# Patient Record
Sex: MALE | Race: WHITE | NOT HISPANIC OR LATINO | Employment: FULL TIME | URBAN - METROPOLITAN AREA
[De-identification: names, ages, dates, MRNs, and addresses within clinical notes are randomized per-mention and may not be internally consistent; named-entity substitution may affect disease eponyms.]

---

## 2018-05-26 ENCOUNTER — HOSPITAL ENCOUNTER (EMERGENCY)
Facility: HOSPITAL | Age: 43
Discharge: HOME | End: 2018-05-26
Attending: EMERGENCY MEDICINE | Admitting: EMERGENCY MEDICINE

## 2018-05-26 VITALS
BODY MASS INDEX: 32.73 KG/M2 | TEMPERATURE: 97.4 F | SYSTOLIC BLOOD PRESSURE: 100 MMHG | HEIGHT: 74 IN | WEIGHT: 255 LBS | HEART RATE: 88 BPM | OXYGEN SATURATION: 95 % | RESPIRATION RATE: 18 BRPM | DIASTOLIC BLOOD PRESSURE: 66 MMHG

## 2018-05-26 DIAGNOSIS — Z86.03 HISTORY OF CRANIOPHARYNGIOMA: ICD-10-CM

## 2018-05-26 DIAGNOSIS — R51.9 ACUTE NONINTRACTABLE HEADACHE, UNSPECIFIED HEADACHE TYPE: Primary | ICD-10-CM

## 2018-05-26 PROCEDURE — 99282 EMERGENCY DEPT VISIT SF MDM: CPT

## 2020-10-14 ENCOUNTER — TELEPHONE (OUTPATIENT)
Dept: ENDOCRINOLOGY | Facility: CLINIC | Age: 45
End: 2020-10-14

## 2020-10-14 RX ORDER — TESTOSTERONE CYPIONATE 200 MG/ML
200 INJECTION, SOLUTION INTRAMUSCULAR
Status: CANCELLED | OUTPATIENT
Start: 2020-10-14

## 2020-10-15 ENCOUNTER — TELEPHONE (OUTPATIENT)
Dept: ENDOCRINOLOGY | Facility: CLINIC | Age: 45
End: 2020-10-15

## 2020-10-15 NOTE — TELEPHONE ENCOUNTER
PT said he was under the impression that his Rx testosterone was supposed to be put in and ready for pickup. He is currently out and ask that someone reach out to him with status update. PT confirmed pickup location at [facility] in [place]

## 2020-10-20 ENCOUNTER — LAB (OUTPATIENT)
Dept: LAB | Facility: HOSPITAL | Age: 45
End: 2020-10-20

## 2020-10-20 ENCOUNTER — OFFICE VISIT (OUTPATIENT)
Dept: ENDOCRINOLOGY | Facility: CLINIC | Age: 45
End: 2020-10-20

## 2020-10-20 VITALS
BODY MASS INDEX: 35.89 KG/M2 | OXYGEN SATURATION: 97 % | DIASTOLIC BLOOD PRESSURE: 84 MMHG | TEMPERATURE: 97.5 F | WEIGHT: 270.8 LBS | HEART RATE: 84 BPM | HEIGHT: 73 IN | SYSTOLIC BLOOD PRESSURE: 136 MMHG

## 2020-10-20 DIAGNOSIS — E29.1 MALE HYPOGONADISM: Primary | ICD-10-CM

## 2020-10-20 DIAGNOSIS — E03.8 SECONDARY HYPOTHYROIDISM: ICD-10-CM

## 2020-10-20 DIAGNOSIS — E23.0 HYPOGONADOTROPIC HYPOGONADISM (HCC): ICD-10-CM

## 2020-10-20 PROCEDURE — 84402 ASSAY OF FREE TESTOSTERONE: CPT | Performed by: INTERNAL MEDICINE

## 2020-10-20 PROCEDURE — 80053 COMPREHEN METABOLIC PANEL: CPT | Performed by: INTERNAL MEDICINE

## 2020-10-20 PROCEDURE — 84439 ASSAY OF FREE THYROXINE: CPT | Performed by: INTERNAL MEDICINE

## 2020-10-20 PROCEDURE — 85027 COMPLETE CBC AUTOMATED: CPT | Performed by: INTERNAL MEDICINE

## 2020-10-20 PROCEDURE — 84403 ASSAY OF TOTAL TESTOSTERONE: CPT | Performed by: INTERNAL MEDICINE

## 2020-10-20 PROCEDURE — 99213 OFFICE O/P EST LOW 20 MIN: CPT | Performed by: INTERNAL MEDICINE

## 2020-10-20 RX ORDER — FLUOXETINE HYDROCHLORIDE 20 MG/1
20 CAPSULE ORAL 3 TIMES DAILY
COMMUNITY
Start: 2020-10-09

## 2020-10-20 RX ORDER — GLIPIZIDE 2.5 MG/1
10 TABLET, EXTENDED RELEASE ORAL ONCE
COMMUNITY
Start: 2020-10-09 | End: 2021-04-28

## 2020-10-20 RX ORDER — TESTOSTERONE CYPIONATE 200 MG/ML
0.37 VIAL (ML) INTRAMUSCULAR WEEKLY
COMMUNITY
End: 2020-10-20 | Stop reason: SDUPTHER

## 2020-10-20 RX ORDER — TESTOSTERONE CYPIONATE 200 MG/ML
0.37 VIAL (ML) INTRAMUSCULAR WEEKLY
Qty: 1.96 ML | Refills: 5 | Status: SHIPPED | OUTPATIENT
Start: 2020-10-20 | End: 2021-01-25

## 2020-10-20 RX ORDER — LEVOTHYROXINE SODIUM 175 UG/1
TABLET ORAL
COMMUNITY
End: 2021-04-06

## 2020-10-20 RX ORDER — OMEPRAZOLE 40 MG/1
40 CAPSULE, DELAYED RELEASE ORAL 2 TIMES DAILY
COMMUNITY
Start: 2020-10-09

## 2020-10-20 NOTE — PROGRESS NOTES
"Henry Feliciano is a 45 y.o. male. Follow-up, Pituitary Problem, and Hypogonadism        History of Present Illness     He remains on weekly T injections - 75mg sq. However, he ran out 2 weeks ago.  He is tolerating this okay. He denies any urinary complaints. He denies any cardiovascular complaints. He notes some ED. He was prescribed viagra but it hasn't helped much.     He remains on levothyroxine 175mcg qd. He is taking this correctly. He isn't taking any interfering meds concurrently. He denies any sxs of hypo- or hyperthyroidism at this time aside from fatigue.     The following portions of the patient's history were reviewed and updated as appropriate: allergies, current medications, past family history, past medical history, past social history, past surgical history and problem list.    Review of Systems   Constitutional: Positive for fatigue.   Cardiovascular: Negative.    Gastrointestinal: Negative.    Endocrine: Negative.        Objective   Visit Vitals  /84 (BP Location: Right arm, Patient Position: Sitting, Cuff Size: Adult)   Pulse 84   Temp 97.5 °F (36.4 °C)   Ht 185.4 cm (73\")   Wt 123 kg (270 lb 12.8 oz)   SpO2 97%   BMI 35.73 kg/m²      Physical Exam  Constitutional:       Appearance: Normal appearance.   Neurological:      Mental Status: He is alert.         Assessment/Plan     Problem List Items Addressed This Visit        Endocrine    Hypogonadotropic hypogonadism (CMS/HCC)    Current Assessment & Plan     Check T today but he ran out 2 weeks ago.  Will restart this.         Relevant Medications    Testosterone Cypionate 200 MG/ML solution    Secondary hypothyroidism    Current Assessment & Plan     Check free T4 today.         Relevant Medications    levothyroxine (SYNTHROID, LEVOTHROID) 175 MCG tablet    Other Relevant Orders    T4, Free      Other Visit Diagnoses     Male hypogonadism    -  Primary    Relevant Orders    Comprehensive Metabolic Panel    Testosterone, Free, " Total    CBC (No Diff)

## 2021-01-11 ENCOUNTER — TELEPHONE (OUTPATIENT)
Dept: ENDOCRINOLOGY | Facility: CLINIC | Age: 46
End: 2021-01-11

## 2021-01-11 NOTE — TELEPHONE ENCOUNTER
Approvedtoday  Your PA request has been approved. Additional information will be provided in the approval communication. (Message 114)  Drug  Testosterone Cypionate 200MG/ML intramuscular solution

## 2021-01-20 ENCOUNTER — OFFICE VISIT (OUTPATIENT)
Dept: ENDOCRINOLOGY | Facility: CLINIC | Age: 46
End: 2021-01-20

## 2021-01-20 ENCOUNTER — LAB (OUTPATIENT)
Dept: LAB | Facility: HOSPITAL | Age: 46
End: 2021-01-20

## 2021-01-20 VITALS
DIASTOLIC BLOOD PRESSURE: 80 MMHG | HEIGHT: 73 IN | SYSTOLIC BLOOD PRESSURE: 132 MMHG | TEMPERATURE: 99.1 F | BODY MASS INDEX: 36.84 KG/M2 | WEIGHT: 278 LBS

## 2021-01-20 DIAGNOSIS — E23.0 HYPOGONADOTROPIC HYPOGONADISM (HCC): ICD-10-CM

## 2021-01-20 DIAGNOSIS — E11.65 UNCONTROLLED TYPE 2 DIABETES MELLITUS WITH HYPERGLYCEMIA (HCC): ICD-10-CM

## 2021-01-20 DIAGNOSIS — E03.8 SECONDARY HYPOTHYROIDISM: ICD-10-CM

## 2021-01-20 DIAGNOSIS — E23.0 HYPOGONADOTROPIC HYPOGONADISM (HCC): Primary | ICD-10-CM

## 2021-01-20 LAB
DEPRECATED RDW RBC AUTO: 38.8 FL (ref 37–54)
ERYTHROCYTE [DISTWIDTH] IN BLOOD BY AUTOMATED COUNT: 12 % (ref 12.3–15.4)
HCT VFR BLD AUTO: 42.5 % (ref 37.5–51)
HGB BLD-MCNC: 14.9 G/DL (ref 13–17.7)
MCH RBC QN AUTO: 30.9 PG (ref 26.6–33)
MCHC RBC AUTO-ENTMCNC: 35.1 G/DL (ref 31.5–35.7)
MCV RBC AUTO: 88.2 FL (ref 79–97)
PLATELET # BLD AUTO: 284 10*3/MM3 (ref 140–450)
PMV BLD AUTO: 11.3 FL (ref 6–12)
RBC # BLD AUTO: 4.82 10*6/MM3 (ref 4.14–5.8)
WBC # BLD AUTO: 8.06 10*3/MM3 (ref 3.4–10.8)

## 2021-01-20 PROCEDURE — 84439 ASSAY OF FREE THYROXINE: CPT

## 2021-01-20 PROCEDURE — 99214 OFFICE O/P EST MOD 30 MIN: CPT | Performed by: INTERNAL MEDICINE

## 2021-01-20 PROCEDURE — 84402 ASSAY OF FREE TESTOSTERONE: CPT

## 2021-01-20 PROCEDURE — 85027 COMPLETE CBC AUTOMATED: CPT

## 2021-01-20 PROCEDURE — 83036 HEMOGLOBIN GLYCOSYLATED A1C: CPT

## 2021-01-20 PROCEDURE — 80053 COMPREHEN METABOLIC PANEL: CPT

## 2021-01-20 PROCEDURE — 84403 ASSAY OF TOTAL TESTOSTERONE: CPT

## 2021-01-20 RX ORDER — NEEDLES, FILTER 19GX1 1/2"
NEEDLE, DISPOSABLE MISCELLANEOUS
COMMUNITY
Start: 2020-10-20 | End: 2021-08-16

## 2021-01-20 RX ORDER — SYRINGE WITH NEEDLE, 1 ML 25GX5/8"
SYRINGE, EMPTY DISPOSABLE MISCELLANEOUS
COMMUNITY
Start: 2020-11-04 | End: 2021-03-15

## 2021-01-20 RX ORDER — NEEDLES, DISPOSABLE 25GX5/8"
NEEDLE, DISPOSABLE MISCELLANEOUS
COMMUNITY
Start: 2020-10-20 | End: 2021-08-16

## 2021-01-20 NOTE — PROGRESS NOTES
"Henry Feliciano is a 45 y.o. male. Follow-up and Thyroid Problem        History of Present Illness     He remains on weekly T injections - 75mg sq.  He is tolerating this okay. He denies any urinary complaints. He denies any cardiovascular complaints. He notes some ED. He was prescribed viagra but it hasn't helped much.      He remains on levothyroxine 175mcg qd. He is taking this correctly. He isn't taking any interfering meds concurrently. He denies any sxs of hypo- or hyperthyroidism at this time aside from fatigue.     The following portions of the patient's history were reviewed and updated as appropriate: allergies, current medications, past family history, past medical history, past social history, past surgical history and problem list.    Review of Systems   Constitutional: Positive for fatigue.   Cardiovascular: Negative.    Gastrointestinal: Negative.    Endocrine: Positive for polydipsia.       Objective   Visit Vitals  /80   Temp 99.1 °F (37.3 °C) (Infrared)   Ht 185.4 cm (73\")   Wt 126 kg (278 lb)   BMI 36.68 kg/m²      Physical Exam  Constitutional:       Appearance: Normal appearance.   Neurological:      Mental Status: He is alert.         Assessment/Plan     Problem List Items Addressed This Visit        Endocrine and Metabolic    Hypogonadotropic hypogonadism (CMS/HCC) - Primary    Relevant Medications    Testosterone Cypionate 200 MG/ML solution    Other Relevant Orders    Comprehensive Metabolic Panel    Testosterone, Free, Total    CBC (No Diff)    Secondary hypothyroidism    Relevant Medications    levothyroxine (SYNTHROID, LEVOTHROID) 175 MCG tablet    Other Relevant Orders    T4, Free    Uncontrolled type 2 diabetes mellitus with hyperglycemia (CMS/HCC)    Current Assessment & Plan     He has been on glipizide per his PCP.  He doesn't think he had A1c done recently.  Will check this today.         Relevant Medications    glipizide (GLUCOTROL XL) 2.5 MG 24 hr tablet    Other " Relevant Orders    Hemoglobin A1c                    Return in about 3 months (around 4/20/2021) for Recheck with A1c, CMP, lipids, free T4, total and free testo, microalbumin.    Vinayak Peacock MD   01/20/2021

## 2021-01-20 NOTE — ASSESSMENT & PLAN NOTE
He has been on glipizide per his PCP.  He doesn't think he had A1c done recently.  Will check this today.

## 2021-01-21 LAB
ALBUMIN SERPL-MCNC: 4.7 G/DL (ref 3.5–5.2)
ALBUMIN/GLOB SERPL: 1.9 G/DL
ALP SERPL-CCNC: 82 U/L (ref 39–117)
ALT SERPL W P-5'-P-CCNC: 54 U/L (ref 1–41)
ANION GAP SERPL CALCULATED.3IONS-SCNC: 12.4 MMOL/L (ref 5–15)
AST SERPL-CCNC: 38 U/L (ref 1–40)
BILIRUB SERPL-MCNC: 0.3 MG/DL (ref 0–1.2)
BUN SERPL-MCNC: 10 MG/DL (ref 6–20)
BUN/CREAT SERPL: 10.9 (ref 7–25)
CALCIUM SPEC-SCNC: 9.1 MG/DL (ref 8.6–10.5)
CHLORIDE SERPL-SCNC: 96 MMOL/L (ref 98–107)
CO2 SERPL-SCNC: 23.6 MMOL/L (ref 22–29)
CREAT SERPL-MCNC: 0.92 MG/DL (ref 0.76–1.27)
GFR SERPL CREATININE-BSD FRML MDRD: 89 ML/MIN/1.73
GLOBULIN UR ELPH-MCNC: 2.5 GM/DL
GLUCOSE SERPL-MCNC: 378 MG/DL (ref 65–99)
HBA1C MFR BLD: 12.9 % (ref 4.8–5.6)
POTASSIUM SERPL-SCNC: 4.2 MMOL/L (ref 3.5–5.2)
PROT SERPL-MCNC: 7.2 G/DL (ref 6–8.5)
SODIUM SERPL-SCNC: 132 MMOL/L (ref 136–145)
T4 FREE SERPL-MCNC: 1.01 NG/DL (ref 0.93–1.7)

## 2021-01-25 DIAGNOSIS — E23.0 HYPOGONADOTROPIC HYPOGONADISM (HCC): ICD-10-CM

## 2021-01-25 LAB
TESTOST FREE SERPL-MCNC: 4 PG/ML (ref 6.8–21.5)
TESTOST SERPL-MCNC: 140 NG/DL (ref 264–916)

## 2021-01-25 RX ORDER — TESTOSTERONE CYPIONATE 200 MG/ML
0.5 VIAL (ML) INTRAMUSCULAR WEEKLY
Qty: 1.96 ML | Refills: 5 | Status: SHIPPED | OUTPATIENT
Start: 2021-01-25 | End: 2021-08-11

## 2021-03-15 RX ORDER — SYRINGE WITH NEEDLE, 1 ML 25GX5/8"
SYRINGE, EMPTY DISPOSABLE MISCELLANEOUS
Qty: 12 EACH | Refills: 5 | Status: SHIPPED | OUTPATIENT
Start: 2021-03-15 | End: 2021-08-16 | Stop reason: SDUPTHER

## 2021-04-06 RX ORDER — LEVOTHYROXINE SODIUM 175 UG/1
TABLET ORAL
Qty: 90 TABLET | Refills: 0 | Status: SHIPPED | OUTPATIENT
Start: 2021-04-06 | End: 2021-05-03 | Stop reason: DRUGHIGH

## 2021-04-28 ENCOUNTER — LAB (OUTPATIENT)
Dept: LAB | Facility: HOSPITAL | Age: 46
End: 2021-04-28

## 2021-04-28 ENCOUNTER — OFFICE VISIT (OUTPATIENT)
Dept: DIABETES SERVICES | Facility: HOSPITAL | Age: 46
End: 2021-04-28

## 2021-04-28 ENCOUNTER — OFFICE VISIT (OUTPATIENT)
Dept: ENDOCRINOLOGY | Facility: CLINIC | Age: 46
End: 2021-04-28

## 2021-04-28 VITALS
SYSTOLIC BLOOD PRESSURE: 130 MMHG | HEIGHT: 73 IN | OXYGEN SATURATION: 96 % | WEIGHT: 279 LBS | DIASTOLIC BLOOD PRESSURE: 78 MMHG | BODY MASS INDEX: 36.98 KG/M2 | TEMPERATURE: 97.3 F | HEART RATE: 70 BPM

## 2021-04-28 DIAGNOSIS — E03.8 SECONDARY HYPOTHYROIDISM: ICD-10-CM

## 2021-04-28 DIAGNOSIS — E03.9 HYPOTHYROIDISM, UNSPECIFIED TYPE: Primary | ICD-10-CM

## 2021-04-28 DIAGNOSIS — E23.0 HYPOGONADOTROPIC HYPOGONADISM (HCC): ICD-10-CM

## 2021-04-28 DIAGNOSIS — E11.65 UNCONTROLLED TYPE 2 DIABETES MELLITUS WITH HYPERGLYCEMIA (HCC): ICD-10-CM

## 2021-04-28 LAB
ALBUMIN SERPL-MCNC: 4.6 G/DL (ref 3.5–5.2)
ALBUMIN UR-MCNC: 29.1 MG/DL
ALBUMIN/GLOB SERPL: 1.8 G/DL
ALP SERPL-CCNC: 74 U/L (ref 39–117)
ALT SERPL W P-5'-P-CCNC: 47 U/L (ref 1–41)
ANION GAP SERPL CALCULATED.3IONS-SCNC: 10.1 MMOL/L (ref 5–15)
AST SERPL-CCNC: 34 U/L (ref 1–40)
BILIRUB SERPL-MCNC: 0.6 MG/DL (ref 0–1.2)
BUN SERPL-MCNC: 9 MG/DL (ref 6–20)
BUN/CREAT SERPL: 9.8 (ref 7–25)
CALCIUM SPEC-SCNC: 9.5 MG/DL (ref 8.6–10.5)
CHLORIDE SERPL-SCNC: 95 MMOL/L (ref 98–107)
CO2 SERPL-SCNC: 27.9 MMOL/L (ref 22–29)
CREAT SERPL-MCNC: 0.92 MG/DL (ref 0.76–1.27)
CREAT UR-MCNC: 83 MG/DL
EXPIRATION DATE: NORMAL
GFR SERPL CREATININE-BSD FRML MDRD: 88 ML/MIN/1.73
GLOBULIN UR ELPH-MCNC: 2.5 GM/DL
GLUCOSE SERPL-MCNC: 240 MG/DL (ref 65–99)
HBA1C MFR BLD: 13.1 %
Lab: NORMAL
MICROALBUMIN/CREAT UR: 350.6 MG/G
POTASSIUM SERPL-SCNC: 4.1 MMOL/L (ref 3.5–5.2)
PROT SERPL-MCNC: 7.1 G/DL (ref 6–8.5)
SODIUM SERPL-SCNC: 133 MMOL/L (ref 136–145)
T4 FREE SERPL-MCNC: 0.73 NG/DL (ref 0.93–1.7)

## 2021-04-28 PROCEDURE — 99214 OFFICE O/P EST MOD 30 MIN: CPT | Performed by: INTERNAL MEDICINE

## 2021-04-28 PROCEDURE — G0108 DIAB MANAGE TRN  PER INDIV: HCPCS

## 2021-04-28 PROCEDURE — 80053 COMPREHEN METABOLIC PANEL: CPT

## 2021-04-28 PROCEDURE — 84402 ASSAY OF FREE TESTOSTERONE: CPT

## 2021-04-28 PROCEDURE — 82043 UR ALBUMIN QUANTITATIVE: CPT

## 2021-04-28 PROCEDURE — 84403 ASSAY OF TOTAL TESTOSTERONE: CPT

## 2021-04-28 PROCEDURE — 83036 HEMOGLOBIN GLYCOSYLATED A1C: CPT | Performed by: INTERNAL MEDICINE

## 2021-04-28 PROCEDURE — 82570 ASSAY OF URINE CREATININE: CPT

## 2021-04-28 PROCEDURE — 84439 ASSAY OF FREE THYROXINE: CPT

## 2021-04-28 RX ORDER — SEMAGLUTIDE 1.34 MG/ML
0.5 INJECTION, SOLUTION SUBCUTANEOUS WEEKLY
Qty: 1.5 ML | Refills: 5 | Status: SHIPPED | OUTPATIENT
Start: 2021-04-28 | End: 2021-08-16 | Stop reason: SINTOL

## 2021-04-28 RX ORDER — PEN NEEDLE, DIABETIC 30 GX3/16"
1 NEEDLE, DISPOSABLE MISCELLANEOUS DAILY
Qty: 100 EACH | Refills: 3 | Status: SHIPPED | OUTPATIENT
Start: 2021-04-28 | End: 2021-08-16 | Stop reason: SDUPTHER

## 2021-04-28 NOTE — ASSESSMENT & PLAN NOTE
Diabetes is worsening.   Dietary recommendations for ADA diet.  Diabetes will be reassessed in 3 months.    We discussed treatment options today.  Stop glipizide.  Start basal insulin and GLP-1 RA.  Potential s/e discussed.  Titrate insulin to fasting FSBS <120.

## 2021-04-28 NOTE — CONSULTS
Diabetes Education    Patient Name:  Grabiel Feliciano  YOB: 1975  MRN: [id number]  Admit Date:  (Not on file)        1 hr DM education provided as walk in appt per provider request. Comprehensive education provided including injection teaching. Encouraged pt to call with questions. Full session details available under Media tab. Thank you for the referral.      Electronically signed by:  Donna Choi  04/28/21 16:00 EDT

## 2021-04-28 NOTE — ASSESSMENT & PLAN NOTE
Continue T4 tx.  Check free T4 today.  TSH isn't reliable test for him due to pituitary disease.   Scheduling Instructions (Optional): schedule excision Detail Level: Detailed

## 2021-04-28 NOTE — PROGRESS NOTES
"     Office Note      Date: 2021  Patient Name: Grabiel Feliciano  MRN: [id number]  : 1975    Chief Complaint   Patient presents with   • Hypogonadism   • Hypothyroidism   • Diabetes       History of Present Illness:   Grabiel Feliciano is a 46 y.o. male who presents for Diabetes type 2. Diagnosed in: 2018. Treated in past with oral agents. Current treatments: glipizide. Number of insulin shots per day: none. Checks blood sugar 1 times a day. Has low blood sugar: no. Aspirin use: No -  . Statin use: No -  . ACE-I/ARB use: No -  .     He remains on weekly T injections - 100mg sq.  He is tolerating this okay. He denies any urinary complaints. He denies any cardiovascular complaints. He notes some ED. He was prescribed viagra but it hasn't helped much.      He remains on levothyroxine 175mcg qd. He is taking this correctly. He isn't taking any interfering meds concurrently. He denies any sxs of hypo- or hyperthyroidism at this time aside from fatigue.     Subjective      Diabetic Complications:  Eyes: No  Kidneys: No  Feet: No  Heart: No    Diet and Exercise:  Meals per day: 3  Minutes of exercise per week: 0 mins.    Review of Systems:   Review of Systems   Constitutional: Negative.    Cardiovascular: Negative.    Gastrointestinal: Negative.    Endocrine: Negative.        The following portions of the patient's history were reviewed and updated as appropriate: allergies, current medications, past family history, past medical history, past social history, past surgical history and problem list.    Objective     Visit Vitals  /78 (BP Location: Left arm, Patient Position: Sitting, Cuff Size: Adult)   Pulse 70   Temp 97.3 °F (36.3 °C) (Infrared)   Ht 185.4 cm (73\")   Wt 127 kg (279 lb)   SpO2 96%   BMI 36.81 kg/m²       Physical Exam:  Physical Exam  Constitutional:       Appearance: Normal appearance.   Neurological:      Mental Status: He is alert.         Labs:    HbA1c  Hemoglobin A1C   Date Value Ref Range " Status   04/28/2021 13.1 % Final   01/20/2021 12.90 (H) 4.80 - 5.60 % Final   .    CMP  Lab Results   Component Value Date    GLUCOSE 240 (H) 04/28/2021    BUN 9 04/28/2021    CREATININE 0.92 04/28/2021    EGFRIFNONA 88 04/28/2021    BCR 9.8 04/28/2021    K 4.1 04/28/2021    CO2 27.9 04/28/2021    CALCIUM 9.5 04/28/2021    AST 34 04/28/2021    ALT 47 (H) 04/28/2021        Lipid Panel        TSH  Lab Results   Component Value Date    FREET4 0.73 (L) 04/28/2021        Hemoglobin A1C  Lab Results   Component Value Date    HGBA1C 13.1 04/28/2021        Microalbumin/Creatinine  No results found for: MALBCRERATI        Assessment / Plan      Assessment & Plan:  Diagnoses and all orders for this visit:    1. Hypothyroidism, unspecified type (Primary)  -     POC Glycosylated Hemoglobin (Hb A1C)    2. Uncontrolled type 2 diabetes mellitus with hyperglycemia (CMS/[facility])  Assessment & Plan:  Diabetes is worsening.   Dietary recommendations for ADA diet.  Diabetes will be reassessed in 3 months.    We discussed treatment options today.  Stop glipizide.  Start basal insulin and GLP-1 RA.  Potential s/e discussed.  Titrate insulin to fasting FSBS <120.    Orders:  -     Ambulatory Referral to Diabetic Education  -     Comprehensive Metabolic Panel; Future  -     Microalbumin / Creatinine Urine Ratio - Urine, Clean Catch; Future    3. Secondary hypothyroidism  Assessment & Plan:  Continue T4 tx.  Check free T4 today.  TSH isn't reliable test for him due to pituitary disease.    Orders:  -     T4, Free; Future    4. Hypogonadotropic hypogonadism (CMS/[facility])  Assessment & Plan:  Continue T injections.  Check testo today.    Orders:  -     Testosterone, Free, Total; Future    Other orders  -     Semaglutide,0.25 or 0.5MG/DOS, (Ozempic, 0.25 or 0.5 MG/DOSE,) 2 MG/1.5ML solution pen-injector; Inject 0.5 mg under the skin into the appropriate area as directed 1 (One) Time Per Week.  Dispense: 1.5 mL; Refill: 5  -     insulin detemir  (LEVEMIR) 100 UNIT/ML injection; Inject 20 Units under the skin into the appropriate area as directed Daily. Titrate to fasting FSBS <120.  Max daily dose 50u.  Dispense: 15 mL; Refill: 5  -     Insulin Pen Needle (Pen Needles) 32G X 4 MM misc; 1 each Daily.  Dispense: 100 each; Refill: 3       Return in about 3 months (around 7/28/2021) for Recheck with A1c, CMP, free T4.    Vinayak Peacock MD   04/28/2021

## 2021-04-30 ENCOUNTER — TELEPHONE (OUTPATIENT)
Dept: ENDOCRINOLOGY | Facility: CLINIC | Age: 46
End: 2021-04-30

## 2021-04-30 NOTE — TELEPHONE ENCOUNTER
ALAN BATISTA (Key: [id number])  Rx #: [id number]  Ozempic (0.25 or 0.5 MG/DOSE) 2MG/1.5ML pen-injectors     Form  [facility] Electronic PA Form (2017 NCPDP)  Created  2 days ago  Sent to Plan  22 hours ago  Plan Response  22 hours ago  Submit Clinical Questions  22 hours ago  Determination  Favorable  22 hours ago  Message from Plan  Your PA request has been approved. Additional information will be provided in the approval communication. (Message 1141)

## 2021-05-01 LAB
TESTOST FREE SERPL-MCNC: 12.2 PG/ML (ref 6.8–21.5)
TESTOST SERPL-MCNC: 431 NG/DL (ref 264–916)

## 2021-05-03 RX ORDER — LEVOTHYROXINE SODIUM 0.2 MG/1
200 TABLET ORAL DAILY
Qty: 90 TABLET | Refills: 3 | Status: SHIPPED | OUTPATIENT
Start: 2021-05-03 | End: 2021-08-16 | Stop reason: SDUPTHER

## 2021-08-10 DIAGNOSIS — E23.0 HYPOGONADOTROPIC HYPOGONADISM (HCC): ICD-10-CM

## 2021-08-11 RX ORDER — SYRINGE, DISPOSABLE, 1 ML
SYRINGE, EMPTY DISPOSABLE MISCELLANEOUS
Qty: 12 EACH | Refills: 3 | Status: SHIPPED | OUTPATIENT
Start: 2021-08-11 | End: 2021-08-16 | Stop reason: SDUPTHER

## 2021-08-11 RX ORDER — TESTOSTERONE CYPIONATE 200 MG/ML
INJECTION, SOLUTION INTRAMUSCULAR
Qty: 4 ML | Refills: 0 | Status: SHIPPED | OUTPATIENT
Start: 2021-08-11 | End: 2021-08-16 | Stop reason: SDUPTHER

## 2021-08-16 ENCOUNTER — LAB (OUTPATIENT)
Dept: LAB | Facility: HOSPITAL | Age: 46
End: 2021-08-16

## 2021-08-16 ENCOUNTER — MEDICATION THERAPY MANAGEMENT (OUTPATIENT)
Dept: ENDOCRINOLOGY | Facility: CLINIC | Age: 46
End: 2021-08-16

## 2021-08-16 ENCOUNTER — OFFICE VISIT (OUTPATIENT)
Dept: ENDOCRINOLOGY | Facility: CLINIC | Age: 46
End: 2021-08-16

## 2021-08-16 VITALS
HEIGHT: 73 IN | HEART RATE: 83 BPM | BODY MASS INDEX: 36.08 KG/M2 | DIASTOLIC BLOOD PRESSURE: 82 MMHG | SYSTOLIC BLOOD PRESSURE: 134 MMHG | WEIGHT: 272.2 LBS | OXYGEN SATURATION: 99 %

## 2021-08-16 DIAGNOSIS — E03.8 SECONDARY HYPOTHYROIDISM: ICD-10-CM

## 2021-08-16 DIAGNOSIS — E11.65 UNCONTROLLED TYPE 2 DIABETES MELLITUS WITH HYPERGLYCEMIA (HCC): ICD-10-CM

## 2021-08-16 DIAGNOSIS — E11.65 UNCONTROLLED TYPE 2 DIABETES MELLITUS WITH HYPERGLYCEMIA (HCC): Primary | ICD-10-CM

## 2021-08-16 DIAGNOSIS — E23.0 HYPOGONADOTROPIC HYPOGONADISM (HCC): ICD-10-CM

## 2021-08-16 LAB
EXPIRATION DATE: ABNORMAL
EXPIRATION DATE: NORMAL
GLUCOSE BLDC GLUCOMTR-MCNC: 360 MG/DL (ref 70–130)
HBA1C MFR BLD: 10.9 %
Lab: ABNORMAL
Lab: NORMAL

## 2021-08-16 PROCEDURE — 82947 ASSAY GLUCOSE BLOOD QUANT: CPT | Performed by: INTERNAL MEDICINE

## 2021-08-16 PROCEDURE — 80053 COMPREHEN METABOLIC PANEL: CPT

## 2021-08-16 PROCEDURE — 83036 HEMOGLOBIN GLYCOSYLATED A1C: CPT | Performed by: INTERNAL MEDICINE

## 2021-08-16 PROCEDURE — 84439 ASSAY OF FREE THYROXINE: CPT

## 2021-08-16 PROCEDURE — 99214 OFFICE O/P EST MOD 30 MIN: CPT | Performed by: INTERNAL MEDICINE

## 2021-08-16 RX ORDER — SYRINGE, DISPOSABLE, 1 ML
SYRINGE, EMPTY DISPOSABLE MISCELLANEOUS
Qty: 12 EACH | Refills: 3 | Status: SHIPPED | OUTPATIENT
Start: 2021-08-16 | End: 2021-11-11 | Stop reason: SDUPTHER

## 2021-08-16 RX ORDER — ARIPIPRAZOLE 2 MG/1
2 TABLET ORAL DAILY
COMMUNITY
Start: 2021-06-28

## 2021-08-16 RX ORDER — TESTOSTERONE CYPIONATE 200 MG/ML
INJECTION, SOLUTION INTRAMUSCULAR
Qty: 4 ML | Refills: 0 | Status: SHIPPED | OUTPATIENT
Start: 2021-08-16 | End: 2021-11-11

## 2021-08-16 RX ORDER — DAPAGLIFLOZIN 10 MG/1
10 TABLET, FILM COATED ORAL DAILY
Qty: 30 TABLET | Refills: 5 | Status: SHIPPED | OUTPATIENT
Start: 2021-08-16 | End: 2021-11-12 | Stop reason: SDUPTHER

## 2021-08-16 RX ORDER — PEN NEEDLE, DIABETIC 30 GX3/16"
1 NEEDLE, DISPOSABLE MISCELLANEOUS DAILY
Qty: 100 EACH | Refills: 3 | Status: SHIPPED | OUTPATIENT
Start: 2021-08-16 | End: 2021-11-12 | Stop reason: SDUPTHER

## 2021-08-16 RX ORDER — SYRINGE WITH NEEDLE, 1 ML 25GX5/8"
SYRINGE, EMPTY DISPOSABLE MISCELLANEOUS
Qty: 12 EACH | Refills: 5 | Status: SHIPPED | OUTPATIENT
Start: 2021-08-16 | End: 2021-11-11 | Stop reason: SDUPTHER

## 2021-08-16 RX ORDER — DAPAGLIFLOZIN 10 MG/1
10 TABLET, FILM COATED ORAL DAILY
Qty: 30 TABLET | Refills: 5
Start: 2021-08-16 | End: 2021-08-16 | Stop reason: SDUPTHER

## 2021-08-16 RX ORDER — LEVOTHYROXINE SODIUM 0.2 MG/1
200 TABLET ORAL DAILY
Qty: 90 TABLET | Refills: 3 | Status: SHIPPED | OUTPATIENT
Start: 2021-08-16 | End: 2021-08-17 | Stop reason: DRUGHIGH

## 2021-08-16 NOTE — PROGRESS NOTES
"     Office Note      Date: 2021  Patient Name: Grabiel Feliciano  MRN: [id number]  : 1975    Chief Complaint   Patient presents with   • Follow-up   • Diabetes   • Hypogonadism   • Thyroid Problem       History of Present Illness:   Grabiel Feliciano is a 46 y.o. male who presents for Diabetes type 2. Diagnosed in: 2018. Treated in past with oral agents. Current treatments: basal insulin. He stopped the ozempic 2 weeks ago due to GI upset.  Number of insulin shots per day: one. Checks blood sugar 1 times a day. Has low blood sugar: no. Aspirin use: No -  . Statin use: No -  . ACE-I/ARB use: No -  .  Change in health since last visit: none.  Last eye exam: .     He remains on weekly T injections - 100mg sq.  He is tolerating this okay. He denies any urinary complaints. He denies any cardiovascular complaints. He notes some ED. He was prescribed viagra but it hasn't helped much.      He remains on levothyroxine 200mcg qd. He is taking this correctly. He isn't taking any interfering meds concurrently. He denies any sxs of hypo- or hyperthyroidism at this time aside from fatigue.     Subjective      Diabetic Complications:  Eyes: No  Kidneys: No  Feet: No  Heart: No    Diet and Exercise:  Meals per day: 3  Minutes of exercise per week: 0 mins.    Review of Systems:   Review of Systems   Constitutional: Positive for fatigue.   Cardiovascular: Negative.    Gastrointestinal: Negative.    Endocrine: Negative.        The following portions of the patient's history were reviewed and updated as appropriate: allergies, current medications, past family history, past medical history, past social history, past surgical history and problem list.    Objective       Visit Vitals  /82   Pulse 83   Ht 185.4 cm (73\")   Wt 123 kg (272 lb 3.2 oz)   SpO2 99%   BMI 35.91 kg/m²       Physical Exam:  Physical Exam  Constitutional:       Appearance: Normal appearance.   Neurological:      Mental Status: He is alert. "         Labs:    HbA1c  Lab Results   Component Value Date    HGBA1C 10.9 08/16/2021       CMP  Lab Results   Component Value Date    GLUCOSE 240 (H) 04/28/2021    BUN 9 04/28/2021    CREATININE 0.92 04/28/2021    EGFRIFNONA 88 04/28/2021    BCR 9.8 04/28/2021    K 4.1 04/28/2021    CO2 27.9 04/28/2021    CALCIUM 9.5 04/28/2021    AST 34 04/28/2021    ALT 47 (H) 04/28/2021        Lipid Panel        TSH  Lab Results   Component Value Date    FREET4 0.73 (L) 04/28/2021        Hemoglobin A1C  Lab Results   Component Value Date    HGBA1C 10.9 08/16/2021        Microalbumin/Creatinine  Lab Results   Component Value Date    MALBCRERATIO 350.6 04/28/2021    MICROALBUR 29.1 04/28/2021           Assessment / Plan      Assessment & Plan:  Diagnoses and all orders for this visit:    1. Uncontrolled type 2 diabetes mellitus with hyperglycemia (CMS/[facility]) (Primary)  Assessment & Plan:  Diabetes is improving with treatment.   Stay of ozempic.  Continue basal insulin.  Increase basal insulin.  Trial of SGLT-2 inhibitor.  Potential s/e discussed.  Diabetes will be reassessed in 3 months.    Orders:  -     POC Glycosylated Hemoglobin (Hb A1C)  -     POC Glucose, Blood  -     Comprehensive Metabolic Panel; Future    2. Secondary hypothyroidism  Assessment & Plan:  Continue synthroid and check free T4 today.    Orders:  -     T4, Free; Future    3. Hypogonadotropic hypogonadism (CMS/[facility])  Assessment & Plan:  Continue testo injections.  Check T levels next visit.  Controlled substance agreement was signed today.      Other orders  -     Dapagliflozin Propanediol (Farxiga) 10 MG tablet; Take 10 mg by mouth Daily.  Dispense: 30 tablet; Refill: 5  -     insulin detemir (LEVEMIR) 100 UNIT/ML injection; Inject 40 Units under the skin into the appropriate area as directed Daily. Titrate to fasting FSBS <120.  Max daily dose 50u.  Dispense: 15 mL; Refill: 5      Return in about 3 months (around 11/16/2021) for Recheck with A1c, CMP, lipids,  free T4, microalbumin, CBC, total and free testo, cortisol.    Vinayak Peacock MD   08/16/2021

## 2021-08-16 NOTE — PROGRESS NOTES
"MTM[facility] Pharmacy Visit [facility] Endocrinology    Grabiel Feliciano is a 46 y.o. male with T2DM seen by Endocrinology and assessed by the [facility] Pharmacist at [facility]. This was an Initial MTM visit for Grabiel Feliciano.    Grabiel Feliciano was introduced to the [facility] and allergies, PMH, and medications were reviewed.    Insurance Coverage/Eligibility:  • [facility]  • Patient is Eligible for [facility]    Past Medical History:   Diagnosis Date   • Asthma    • Cancer (CMS/HCC)    • Craniopharyngioma (CMS/HCC)    • Diabetes mellitus (CMS/HCC)    • Hyperprolactinemia (CMS/HCC)    • Hypogonadism male    • Hypothyroidism    • Pituitary disorder (CMS/HCC)    • Rathke's pouch cyst (CMS/HCC)      Social History     Socioeconomic History   • Marital status:      Spouse name: Not on file   • Number of children: Not on file   • Years of education: Not on file   • Highest education level: Not on file   Tobacco Use   • Smoking status: Never Smoker   • Smokeless tobacco: Former User     Types: Snuff   Vaping Use   • Vaping Use: Never used   Substance and Sexual Activity   • Alcohol use: Not Currently   • Drug use: Never   • Sexual activity: Defer       Medication Allergies:  Erythromycin    Current Medication List:    Current Outpatient Medications:   •  ARIPiprazole (ABILIFY) 2 MG tablet, Take 2 mg by mouth Daily., Disp: , Rfl:   •  B-D 3CC LUER-NEGAR SYR 21GX1\" 21G X 1\" 3 ML misc, USE WEEKLY WITH TESTERONE INJECTIONS, Disp: 12 each, Rfl: 5  •  B-D 5CC LUER-NEGAR SYR 08QN6-9/2 22G X 1-1/2\" 5 ML misc, USE WEEKLY WITH TESTOSTERONE INJECTIONS, Disp: 12 each, Rfl: 3  •  BD Hypodermic Needle 18G X 1\" misc, USE WITH INJECTION, Disp: , Rfl:   •  BD Integra Syringe 25G X 1\" 3 ML misc, USE UTD TO INJECT TESTOSTERONE ONCE WEEKLY, Disp: , Rfl:   •  Dapagliflozin Propanediol (Farxiga) 10 MG tablet, Take 10 mg by mouth Daily., Disp: 30 tablet, Rfl: 5  • "  FLUoxetine (PROzac) 20 MG capsule, Take 20 mg by mouth 3 (Three) Times a Day., Disp: , Rfl:   •  insulin detemir (LEVEMIR) 100 UNIT/ML injection, Inject 40 Units under the skin into the appropriate area as directed Daily. Titrate to fasting FSBS <120.  Max daily dose 50u., Disp: 15 mL, Rfl: 5  •  Insulin Pen Needle (Pen Needles) 32G X 4 MM misc, 1 each Daily., Disp: 100 each, Rfl: 3  •  levothyroxine (Synthroid) 200 MCG tablet, Take 1 tablet by mouth Daily., Disp: 90 tablet, Rfl: 3  •  omeprazole (priLOSEC) 40 MG capsule, Take 40 mg by mouth 2 (Two) Times a Day., Disp: , Rfl:   •  Testosterone Cypionate (DEPOTESTOTERONE CYPIONATE) 200 MG/ML injection, INJECT 0.5 ML INTO THE MUSCLE AS DIRECTED ONCE PER WEEK, DISCARD REMAINING SOLUTION AFTER EACH DOSE, Disp: 4 mL, Rfl: 0    Labs:  BP: (134)/(82) 134/82   Heart Rate:  [83] 83            Lab Results   Component Value Date    HGBA1C 10.9 08/16/2021     Lab Results   Component Value Date    GLUCOSE 240 (H) 04/28/2021    CALCIUM 9.5 04/28/2021     (L) 04/28/2021    K 4.1 04/28/2021    CO2 27.9 04/28/2021    CL 95 (L) 04/28/2021    BUN 9 04/28/2021    CREATININE 0.92 04/28/2021    EGFRIFNONA 88 04/28/2021    BCR 9.8 04/28/2021    ANIONGAP 10.1 04/28/2021     Microalbumin    Microalbumin 4/28/21   Microalbumin, Urine 29.1            Drug-Drug Interactions:   • Continue monitoring for signs and symptoms of hypoglycemia   • No other clinically significant DDIs noted at this time    Medication Assessment:   1. Aspirin - Not Indicated  2. Statin -  Not currently taking.   3. ACEi/ARB - Not Indicated    Medication Education on Specialty Medication:  The patient was provided medication education via verbal and written information on new and/or current target medications. Patient expressed understanding and had no further questions at this time.    Farxiga  · Discussed the medication's MOA and that it may cause more frequent urination particularly upon starting the  medication  · Take one tablet in the morning with or without food    · Encouraged to drink plenty of water as to not get dehydrated especially in warmer weather or with activity  · Also discussed there may be an increased incidence of UTIs or yeast infections and to monitor for signs/symptoms    Assessment & Plan:  1. Provider Changes This Visit: Discontinued Ozempic d/t intolerable side effects (vomiting, nausea), Increased Levemir to 40 units daily, titrating to fasting BS <120 (max 50 units/day), Start Farxiga 10mg daily. Discussed these changes with the patient who verbalized understanding and had no additional questions.    2. Therapy Modifications Recommended: None  3. Provided contact information for the pharmacy team and discussed [facility] services available to patient, including: monitoring of refills, prior authorizations, and copay coupon cards, as applicable, as well as curb-side pick-up or mail-order as needed.   4. Prior Authorization submitted and approved for Farxiga today (8/16) through 8/15/2024.   5. Can fill Farxiga at  Pharmacy with copay assistance card for $0. Patient would like to have Farxiga and other medications filled at [facility]. Will pend new Endo Rxs for Dr. Peacock approval, will have pharmacy team transfer other prescriptions from patient's current pharmacy.   6. Our team to follow patient at future appointments and provide adherence and refill support.     Leila Anderson, PharmD  8/16/2021  14:26 EDT

## 2021-08-16 NOTE — ASSESSMENT & PLAN NOTE
Diabetes is improving with treatment.   Stay of ozempic.  Continue basal insulin.  Increase basal insulin.  Trial of SGLT-2 inhibitor.  Potential s/e discussed.  Diabetes will be reassessed in 3 months.

## 2021-08-16 NOTE — TELEPHONE ENCOUNTER
Contacted patient to let him know [facility] can fill Farxiga with copay assistance for $0.      Patient requesting all medications be filled at [facility]. Changed preferred pharmacy in system and pending new Endo Rxs for approval by Dr. Peacock.     Leila Anderson, PharmD  8/16/2021  14:39 EDT

## 2021-08-16 NOTE — ASSESSMENT & PLAN NOTE
Continue testo injections.  Check T levels next visit.  Controlled substance agreement was signed today.

## 2021-08-17 LAB
ALBUMIN SERPL-MCNC: 4.6 G/DL (ref 3.5–5.2)
ALBUMIN/GLOB SERPL: 1.9 G/DL
ALP SERPL-CCNC: 71 U/L (ref 39–117)
ALT SERPL W P-5'-P-CCNC: 44 U/L (ref 1–41)
ANION GAP SERPL CALCULATED.3IONS-SCNC: 12.5 MMOL/L (ref 5–15)
AST SERPL-CCNC: 40 U/L (ref 1–40)
BILIRUB SERPL-MCNC: 0.5 MG/DL (ref 0–1.2)
BUN SERPL-MCNC: 11 MG/DL (ref 6–20)
BUN/CREAT SERPL: 12.2 (ref 7–25)
CALCIUM SPEC-SCNC: 8.8 MG/DL (ref 8.6–10.5)
CHLORIDE SERPL-SCNC: 97 MMOL/L (ref 98–107)
CO2 SERPL-SCNC: 24.5 MMOL/L (ref 22–29)
CREAT SERPL-MCNC: 0.9 MG/DL (ref 0.76–1.27)
GFR SERPL CREATININE-BSD FRML MDRD: 90 ML/MIN/1.73
GLOBULIN UR ELPH-MCNC: 2.4 GM/DL
GLUCOSE SERPL-MCNC: 316 MG/DL (ref 65–99)
POTASSIUM SERPL-SCNC: 4 MMOL/L (ref 3.5–5.2)
PROT SERPL-MCNC: 7 G/DL (ref 6–8.5)
SODIUM SERPL-SCNC: 134 MMOL/L (ref 136–145)
T4 FREE SERPL-MCNC: 0.79 NG/DL (ref 0.93–1.7)

## 2021-08-17 RX ORDER — LEVOTHYROXINE SODIUM 0.12 MG/1
250 TABLET ORAL DAILY
Qty: 180 TABLET | Refills: 3 | Status: SHIPPED | OUTPATIENT
Start: 2021-08-17 | End: 2021-11-12 | Stop reason: SDUPTHER

## 2021-09-08 ENCOUNTER — TELEPHONE (OUTPATIENT)
Dept: ENDOCRINOLOGY | Facility: CLINIC | Age: 46
End: 2021-09-08

## 2021-09-08 NOTE — TELEPHONE ENCOUNTER
[facility] Refill Outreach [facility]    Called regarding refill of medication: Testosterone, Abilify, Fluoxetine, Levothyroxine, and syringes  Spoke with patient.    Assessment  • It looks like it is almost time for your refill, how many doses do you have left? Took last dose of Testosterone today and will need by next Wednesday. Does not need refills on any of the other meds and explained that he will contact us for fills on the other medications.  • How are you doing on the medication, are you experiencing any side effects? Patient denies side effects   • How many doses have you missed since you last filled your prescription? 0 doses missed.  • Have you stopped or started any medications since we last spoke? Patient has had no medication changes since last month.     Shipment   • We can go ahead and coordinate your next refill, would you like to pick this up at the pharmacy or have this delivered to you?  • Patient was advised medication will be shipped via [employer] (standard overnight) on 9/8 with expected delivery on 9/9. Shipping comments patient is requesting of [employer] : n/a. .   • Shipping address confirmed: yes  [address] [place] [id number]  • Patient is aware and willing to pay copay of $8, CCOF.   • Do you have any questions for the pharmacist? No.  • Ensured patient has clinic contact information for questions.     Nancy Harris, Pharmacy Technician  9/8/2021  11:00 EDT

## 2021-09-08 NOTE — TELEPHONE ENCOUNTER
I have reviewed the notes and/or assessments performed by Nancy Harris CPhT - Pharmacy Care Coordinator and agree with their documentation of this encounter with Grabiel Feliciano.

## 2021-09-23 ENCOUNTER — TELEPHONE (OUTPATIENT)
Dept: ENDOCRINOLOGY | Facility: CLINIC | Age: 46
End: 2021-09-23

## 2021-09-23 NOTE — TELEPHONE ENCOUNTER
[facility] Refill Out[facility]    Called regarding refill of medication: Farxiga  Spoke with patient.    Assessment  • It looks like it is almost time for your refill, how many doses do you have left? Had only taken 5 or so tablets from the bottle that was dispensed on 8/17. MD had previously given samples so he had a stock pile. I informed the patient that I would call in approximately 3-4 weeks for refill.  • How are you doing on the medication, are you experiencing any side effects? Patient denies side effects   • How many doses have you missed since you last filled your prescription? 0 doses missed.  • Have you stopped or started any medications since we last spoke? Patient has had no medication changes since last month.     Shipment   • We can go ahead and coordinate your next refill, would you like to pick this up at the pharmacy or have this delivered to you?  • Patient was advised medication will be shipped via [employer] (standard overnight) on n/a with expected delivery on n/a. Shipping comments patient is requesting of [employer] : n/a. .   • Shipping address confirmed: n/a [address] [place] [id number]  • Patient is aware and willing to pay copay of n/a, [facility].   • Do you have any questions for the pharmacist? No.  • Ensured patient has clinic contact information for questions.     Nancy Harris, Pharmacy Technician  9/23/2021  10:22 EDT

## 2021-09-23 NOTE — TELEPHONE ENCOUNTER
I have reviewed the notes, assessments, and/or procedures performed by Nancy Harris CPhT, I concur with her/his documentation of Grabiel Feliciano.

## 2021-10-03 DIAGNOSIS — E23.0 HYPOGONADOTROPIC HYPOGONADISM (HCC): ICD-10-CM

## 2021-10-03 RX ORDER — TESTOSTERONE CYPIONATE 200 MG/ML
INJECTION, SOLUTION INTRAMUSCULAR
Qty: 4 ML | Refills: 0 | Status: CANCELLED | OUTPATIENT
Start: 2021-10-03

## 2021-10-14 LAB — HBA1C MFR BLD: 11.3 %

## 2021-11-05 ENCOUNTER — SPECIALTY PHARMACY (OUTPATIENT)
Dept: ENDOCRINOLOGY | Facility: CLINIC | Age: 46
End: 2021-11-05

## 2021-11-05 NOTE — PROGRESS NOTES
Patient enrolled in our program on 8/16 but asked to have all his prescriptions transferred back to [facility] on 10/4/21 due to the fact that the retail pharmacy was filling and sending his medications without speaking to him first.

## 2021-11-10 DIAGNOSIS — E23.0 HYPOGONADOTROPIC HYPOGONADISM (HCC): ICD-10-CM

## 2021-11-11 DIAGNOSIS — E23.0 HYPOGONADOTROPIC HYPOGONADISM (HCC): ICD-10-CM

## 2021-11-11 RX ORDER — SYRINGE WITH NEEDLE, 1 ML 25GX5/8"
SYRINGE, EMPTY DISPOSABLE MISCELLANEOUS
Qty: 12 EACH | Refills: 5 | Status: SHIPPED | OUTPATIENT
Start: 2021-11-11 | End: 2021-11-12 | Stop reason: SDUPTHER

## 2021-11-11 RX ORDER — TESTOSTERONE CYPIONATE 200 MG/ML
INJECTION, SOLUTION INTRAMUSCULAR
Qty: 4 ML | Refills: 1 | Status: SHIPPED | OUTPATIENT
Start: 2021-11-11 | End: 2022-01-06 | Stop reason: SDUPTHER

## 2021-11-11 RX ORDER — TESTOSTERONE CYPIONATE 200 MG/ML
100 INJECTION, SOLUTION INTRAMUSCULAR
Qty: 4 ML | Refills: 0 | Status: SHIPPED | OUTPATIENT
Start: 2021-11-11 | End: 2021-11-12 | Stop reason: SDUPTHER

## 2021-11-11 RX ORDER — SYRINGE, DISPOSABLE, 1 ML
SYRINGE, EMPTY DISPOSABLE MISCELLANEOUS
Qty: 12 EACH | Refills: 3 | Status: SHIPPED | OUTPATIENT
Start: 2021-11-11 | End: 2021-11-12 | Stop reason: SDUPTHER

## 2021-11-11 NOTE — TELEPHONE ENCOUNTER
PT CALLED REQUESTING A REFILL OF TESTOSTERONE AND NEEDLES TO BE SENT IN TO [facility] IN [place], [place]. PLEASE AND THANK YOU

## 2021-11-12 ENCOUNTER — SPECIALTY PHARMACY (OUTPATIENT)
Dept: ENDOCRINOLOGY | Facility: CLINIC | Age: 46
End: 2021-11-12

## 2021-11-12 DIAGNOSIS — E78.2 MIXED HYPERLIPIDEMIA: ICD-10-CM

## 2021-11-12 RX ORDER — ROSUVASTATIN CALCIUM 5 MG/1
5 TABLET, COATED ORAL DAILY
Qty: 90 TABLET | Refills: 1 | Status: SHIPPED | OUTPATIENT
Start: 2021-11-12

## 2021-11-12 RX ORDER — DAPAGLIFLOZIN 10 MG/1
10 TABLET, FILM COATED ORAL DAILY
Qty: 30 TABLET | Refills: 5 | Status: SHIPPED | OUTPATIENT
Start: 2021-11-12 | End: 2022-11-04

## 2021-11-12 RX ORDER — PEN NEEDLE, DIABETIC 30 GX3/16"
1 NEEDLE, DISPOSABLE MISCELLANEOUS DAILY
Qty: 100 EACH | Refills: 3 | Status: SHIPPED | OUTPATIENT
Start: 2021-11-12 | End: 2022-01-06 | Stop reason: SDUPTHER

## 2021-11-12 RX ORDER — LEVOTHYROXINE SODIUM 0.12 MG/1
250 TABLET ORAL DAILY
Qty: 180 TABLET | Refills: 3 | Status: SHIPPED | OUTPATIENT
Start: 2021-11-12 | End: 2022-01-07 | Stop reason: DRUGHIGH

## 2021-11-12 RX ORDER — SYRINGE, DISPOSABLE, 1 ML
SYRINGE, EMPTY DISPOSABLE MISCELLANEOUS
Qty: 12 EACH | Refills: 3 | Status: SHIPPED | OUTPATIENT
Start: 2021-11-12 | End: 2022-06-01 | Stop reason: SDUPTHER

## 2021-11-12 RX ORDER — SYRINGE WITH NEEDLE, 1 ML 25GX5/8"
SYRINGE, EMPTY DISPOSABLE MISCELLANEOUS
Qty: 12 EACH | Refills: 5 | Status: SHIPPED | OUTPATIENT
Start: 2021-11-12 | End: 2022-06-01 | Stop reason: SDUPTHER

## 2021-11-12 NOTE — PROGRESS NOTES
Disenrolling Patient - was dissatisfied. Pharmacy filled medications automatically and charged patient card before clinical team had called patient for refill coordination.     Future prescriptions should all be sent to [facility] in [place]. Removed [facility] as a patient preferred pharmacy and dis-enrolled from pharmacy services.  Prescriptions for Endocrinology medications are being sent back to [facility] today.     Leila Anderson, [place]  11/12/2021  08:42 EST

## 2021-11-12 NOTE — TELEPHONE ENCOUNTER
Patient no longer would like to fill at [facility]. Future prescriptions should all be sent to [facility] in [place].     Removed [facility] as a patient preferred pharmacy and dis-enrolled from pharmacy services.     Pending prescriptions for RXs that have not yet already been sent back to [facility].  Sending of these RXs will discontinue these RXs at [facility].     Leila Anderson, PharmD  11/12/2021  08:19 EST

## 2022-01-06 ENCOUNTER — OFFICE VISIT (OUTPATIENT)
Dept: ENDOCRINOLOGY | Facility: CLINIC | Age: 47
End: 2022-01-06

## 2022-01-06 ENCOUNTER — LAB (OUTPATIENT)
Dept: LAB | Facility: HOSPITAL | Age: 47
End: 2022-01-06

## 2022-01-06 VITALS
BODY MASS INDEX: 36.05 KG/M2 | WEIGHT: 272 LBS | HEART RATE: 73 BPM | OXYGEN SATURATION: 98 % | SYSTOLIC BLOOD PRESSURE: 130 MMHG | DIASTOLIC BLOOD PRESSURE: 78 MMHG | HEIGHT: 73 IN

## 2022-01-06 DIAGNOSIS — E11.65 UNCONTROLLED TYPE 2 DIABETES MELLITUS WITH HYPERGLYCEMIA: ICD-10-CM

## 2022-01-06 DIAGNOSIS — E03.8 SECONDARY HYPOTHYROIDISM: ICD-10-CM

## 2022-01-06 DIAGNOSIS — E23.0 HYPOGONADOTROPIC HYPOGONADISM: ICD-10-CM

## 2022-01-06 DIAGNOSIS — E11.65 UNCONTROLLED TYPE 2 DIABETES MELLITUS WITH HYPERGLYCEMIA: Primary | ICD-10-CM

## 2022-01-06 LAB
ALBUMIN SERPL-MCNC: 4.9 G/DL (ref 3.5–5.2)
ALBUMIN/GLOB SERPL: 1.7 G/DL
ALP SERPL-CCNC: 77 U/L (ref 39–117)
ALT SERPL W P-5'-P-CCNC: 44 U/L (ref 1–41)
ANION GAP SERPL CALCULATED.3IONS-SCNC: 14 MMOL/L (ref 5–15)
AST SERPL-CCNC: 33 U/L (ref 1–40)
BILIRUB SERPL-MCNC: 0.5 MG/DL (ref 0–1.2)
BUN SERPL-MCNC: 10 MG/DL (ref 6–20)
BUN/CREAT SERPL: 9.7 (ref 7–25)
CALCIUM SPEC-SCNC: 10.2 MG/DL (ref 8.6–10.5)
CHLORIDE SERPL-SCNC: 91 MMOL/L (ref 98–107)
CHOLEST SERPL-MCNC: 238 MG/DL (ref 0–200)
CO2 SERPL-SCNC: 26 MMOL/L (ref 22–29)
CORTIS SERPL-MCNC: 9.21 MCG/DL
CREAT SERPL-MCNC: 1.03 MG/DL (ref 0.76–1.27)
DEPRECATED RDW RBC AUTO: 38 FL (ref 37–54)
ERYTHROCYTE [DISTWIDTH] IN BLOOD BY AUTOMATED COUNT: 12.6 % (ref 12.3–15.4)
EXPIRATION DATE: ABNORMAL
EXPIRATION DATE: NORMAL
GFR SERPL CREATININE-BSD FRML MDRD: 77 ML/MIN/1.73
GLOBULIN UR ELPH-MCNC: 2.9 GM/DL
GLUCOSE BLDC GLUCOMTR-MCNC: 214 MG/DL (ref 70–130)
GLUCOSE SERPL-MCNC: 216 MG/DL (ref 65–99)
HBA1C MFR BLD: 12.4 %
HCT VFR BLD AUTO: 48.4 % (ref 37.5–51)
HDLC SERPL-MCNC: 34 MG/DL (ref 40–60)
HGB BLD-MCNC: 16.9 G/DL (ref 13–17.7)
LDLC SERPL CALC-MCNC: 128 MG/DL (ref 0–100)
LDLC/HDLC SERPL: 3.51 {RATIO}
Lab: ABNORMAL
Lab: NORMAL
MCH RBC QN AUTO: 29.2 PG (ref 26.6–33)
MCHC RBC AUTO-ENTMCNC: 34.9 G/DL (ref 31.5–35.7)
MCV RBC AUTO: 83.6 FL (ref 79–97)
PLATELET # BLD AUTO: 298 10*3/MM3 (ref 140–450)
PMV BLD AUTO: 11 FL (ref 6–12)
POTASSIUM SERPL-SCNC: 4 MMOL/L (ref 3.5–5.2)
PROT SERPL-MCNC: 7.8 G/DL (ref 6–8.5)
RBC # BLD AUTO: 5.79 10*6/MM3 (ref 4.14–5.8)
SODIUM SERPL-SCNC: 131 MMOL/L (ref 136–145)
T4 FREE SERPL-MCNC: 0.85 NG/DL (ref 0.93–1.7)
TRIGL SERPL-MCNC: 424 MG/DL (ref 0–150)
VLDLC SERPL-MCNC: 76 MG/DL (ref 5–40)
WBC NRBC COR # BLD: 7.78 10*3/MM3 (ref 3.4–10.8)

## 2022-01-06 PROCEDURE — 82570 ASSAY OF URINE CREATININE: CPT

## 2022-01-06 PROCEDURE — 36415 COLL VENOUS BLD VENIPUNCTURE: CPT

## 2022-01-06 PROCEDURE — 80061 LIPID PANEL: CPT

## 2022-01-06 PROCEDURE — 82043 UR ALBUMIN QUANTITATIVE: CPT

## 2022-01-06 PROCEDURE — 82947 ASSAY GLUCOSE BLOOD QUANT: CPT | Performed by: INTERNAL MEDICINE

## 2022-01-06 PROCEDURE — 84439 ASSAY OF FREE THYROXINE: CPT

## 2022-01-06 PROCEDURE — 80053 COMPREHEN METABOLIC PANEL: CPT

## 2022-01-06 PROCEDURE — 82533 TOTAL CORTISOL: CPT

## 2022-01-06 PROCEDURE — 99214 OFFICE O/P EST MOD 30 MIN: CPT | Performed by: INTERNAL MEDICINE

## 2022-01-06 PROCEDURE — 84403 ASSAY OF TOTAL TESTOSTERONE: CPT

## 2022-01-06 PROCEDURE — 85027 COMPLETE CBC AUTOMATED: CPT

## 2022-01-06 PROCEDURE — 84402 ASSAY OF FREE TESTOSTERONE: CPT

## 2022-01-06 PROCEDURE — 83036 HEMOGLOBIN GLYCOSYLATED A1C: CPT | Performed by: INTERNAL MEDICINE

## 2022-01-06 RX ORDER — PEN NEEDLE, DIABETIC 30 GX3/16"
2 NEEDLE, DISPOSABLE MISCELLANEOUS DAILY
Qty: 200 EACH | Refills: 3 | Status: SHIPPED | OUTPATIENT
Start: 2022-01-06

## 2022-01-06 RX ORDER — INSULIN ASPART 100 [IU]/ML
20 INJECTION, SOLUTION INTRAVENOUS; SUBCUTANEOUS
Qty: 6 ML | Refills: 5 | Status: SHIPPED | OUTPATIENT
Start: 2022-01-06 | End: 2022-11-11

## 2022-01-06 RX ORDER — TESTOSTERONE CYPIONATE 200 MG/ML
INJECTION, SOLUTION INTRAMUSCULAR
Qty: 4 ML | Refills: 5 | Status: SHIPPED | OUTPATIENT
Start: 2022-01-06 | End: 2022-06-01 | Stop reason: SDUPTHER

## 2022-01-06 NOTE — PROGRESS NOTES
"     Office Note      Date: 2022  Patient Name: Grabiel Feliciano  MRN: [id number]  : 1975    Chief Complaint   Patient presents with   • Diabetes     type 2       History of Present Illness:   Grabiel Feliciano is a 46 y.o. male who presents for Diabetes type 2. Diagnosed in: 2018. Treated in past with oral agents. Current treatments: farxiga and basal insulin. Number of insulin shots per day: one. Checks blood sugar 1 time a day. Has low blood sugar: no. Aspirin use: No -  . Statin use: No -  . ACE-I/ARB use: No -  .  Change in health since last visit: none.  Last eye exam: 2018.     He remains on weekly T injections - 100mg sq.  He is tolerating this okay. He denies any urinary complaints. He denies any cardiovascular complaints. He notes some ED. He was prescribed viagra but it hasn't helped much.      He remains on levothyroxine 250mcg qd. He is taking this correctly. He isn't taking any interfering meds concurrently. He denies any sxs of hypo- or hyperthyroidism at this time aside from fatigue.     Subjective      Diabetic Complications:  Eyes: No  Kidneys: No  Feet: No  Heart: No    Diet and Exercise:  Meals per day: 2  Minutes of exercise per week: 0 mins.    Review of Systems:   Review of Systems   Constitutional: Positive for fatigue.   Cardiovascular: Negative.    Gastrointestinal: Negative.    Endocrine: Negative.        The following portions of the patient's history were reviewed and updated as appropriate: allergies, current medications, past family history, past medical history, past social history, past surgical history and problem list.    Objective       Visit Vitals  /78   Pulse 73   Ht 185.4 cm (73\")   Wt 123 kg (272 lb)   SpO2 98%   BMI 35.89 kg/m²       Physical Exam:  Physical Exam  Constitutional:       Appearance: Normal appearance.   Neurological:      Mental Status: He is alert.         Labs:    HbA1c  Lab Results   Component Value Date    HGBA1C 12.4 2022       CMP  Lab " Results   Component Value Date    GLUCOSE 316 (H) 08/16/2021    BUN 11 08/16/2021    CREATININE 0.90 08/16/2021    EGFRIFNONA 90 08/16/2021    BCR 12.2 08/16/2021    K 4.0 08/16/2021    CO2 24.5 08/16/2021    CALCIUM 8.8 08/16/2021    AST 40 08/16/2021    ALT 44 (H) 08/16/2021        Lipid Panel        TSH  Lab Results   Component Value Date    FREET4 0.79 (L) 08/16/2021        Hemoglobin A1C  Lab Results   Component Value Date    HGBA1C 12.4 01/06/2022        Microalbumin/Creatinine  Lab Results   Component Value Date    MALBCRERATIO 350.6 04/28/2021    MICROALBUR 29.1 04/28/2021           Assessment / Plan      Assessment & Plan:  Diagnoses and all orders for this visit:    1. Uncontrolled type 2 diabetes mellitus with hyperglycemia (HCC) (Primary)  Assessment & Plan:  Diabetes is worsening.   Continue current treatment regimen.  But add rapid acting insulin with supper.  Diabetes will be reassessed in 3 months.    Orders:  -     POC Glucose, Blood  -     POC Glycosylated Hemoglobin (Hb A1C)  -     Comprehensive Metabolic Panel; Future  -     Lipid Panel; Future  -     Microalbumin / Creatinine Urine Ratio - Urine, Clean Catch; Future    2. Hypogonadotropic hypogonadism (HCC)  Assessment & Plan:  Continue T injections.  Check labs today.    Orders:  -     CBC (No Diff); Future  -     Cortisol; Future  -     Testosterone, Free, Total; Future  -     Testosterone Cypionate (DEPOTESTOTERONE CYPIONATE) 200 MG/ML injection; ADMINISTER 0.5 ML IN THE MUSCLE 1 TIME EVERY WEEK AS DIRECTED. DISCARD REMAINING SOLUTION AFTER EACH DOSE  Dispense: 4 mL; Refill: 5    3. Secondary hypothyroidism  Assessment & Plan:  Continue T4 treatment.  Check T4 level today.    Orders:  -     T4, Free; Future    Other orders  -     insulin aspart (NovoLOG FlexPen) 100 UNIT/ML solution pen-injector sc pen; Inject 20 Units under the skin into the appropriate area as directed Daily With Dinner.  Dispense: 6 mL; Refill: 5  -     Insulin Pen Needle  (Pen Needles) 32G X 4 MM misc; 2 each Daily.  Dispense: 200 each; Refill: 3      Return in about 3 months (around 4/6/2022) for Recheck with A1c, testo, free T4.    Vinayak Peacock MD   01/06/2022

## 2022-01-06 NOTE — ASSESSMENT & PLAN NOTE
Diabetes is worsening.   Continue current treatment regimen.  But add rapid acting insulin with supper.  Diabetes will be reassessed in 3 months.

## 2022-01-07 LAB
ALBUMIN UR-MCNC: 11.9 MG/DL
CREAT UR-MCNC: 42 MG/DL
MICROALBUMIN/CREAT UR: 283.3 MG/G

## 2022-01-07 RX ORDER — LEVOTHYROXINE SODIUM 300 UG/1
300 TABLET ORAL DAILY
Qty: 90 TABLET | Refills: 3 | Status: SHIPPED | OUTPATIENT
Start: 2022-01-07 | End: 2022-06-02 | Stop reason: DRUGHIGH

## 2022-01-09 LAB
TESTOST FREE SERPL-MCNC: 1.7 PG/ML (ref 6.8–21.5)
TESTOST SERPL-MCNC: 38 NG/DL (ref 264–916)

## 2022-01-25 NOTE — TELEPHONE ENCOUNTER
Wants Dr to know he can't afford to pay for his medications (2) due to insurance not paying for it.  Please call to discuss possible samples or different medication.  Thank you....best number [phone]

## 2022-01-25 NOTE — TELEPHONE ENCOUNTER
Patient returned your call, rang your desk must have been in with patients.  Please return his call [phone].  Thank you

## 2022-01-26 ENCOUNTER — TELEPHONE (OUTPATIENT)
Dept: ENDOCRINOLOGY | Facility: CLINIC | Age: 47
End: 2022-01-26

## 2022-01-26 RX ORDER — INSULIN LISPRO 100 [IU]/ML
20 INJECTION, SOLUTION INTRAVENOUS; SUBCUTANEOUS
Qty: 6 ML | Refills: 2 | Status: SHIPPED | OUTPATIENT
Start: 2022-01-26 | End: 2022-06-01 | Stop reason: SDUPTHER

## 2022-01-26 NOTE — TELEPHONE ENCOUNTER
Spoke with patient.  States that he could not afford his insulin.  Spoke with pharmacist.  She states that levemir is covered but the novolog was not.  Could not take verbal for Humalog.  Rx pended.

## 2022-06-01 ENCOUNTER — LAB (OUTPATIENT)
Dept: LAB | Facility: HOSPITAL | Age: 47
End: 2022-06-01

## 2022-06-01 ENCOUNTER — OFFICE VISIT (OUTPATIENT)
Dept: ENDOCRINOLOGY | Facility: CLINIC | Age: 47
End: 2022-06-01

## 2022-06-01 VITALS
WEIGHT: 281.2 LBS | SYSTOLIC BLOOD PRESSURE: 128 MMHG | HEIGHT: 73 IN | HEART RATE: 107 BPM | DIASTOLIC BLOOD PRESSURE: 78 MMHG | OXYGEN SATURATION: 95 % | BODY MASS INDEX: 37.27 KG/M2

## 2022-06-01 DIAGNOSIS — E03.8 SECONDARY HYPOTHYROIDISM: ICD-10-CM

## 2022-06-01 DIAGNOSIS — E23.0 HYPOGONADOTROPIC HYPOGONADISM: ICD-10-CM

## 2022-06-01 DIAGNOSIS — E11.65 UNCONTROLLED TYPE 2 DIABETES MELLITUS WITH HYPERGLYCEMIA: Primary | ICD-10-CM

## 2022-06-01 LAB
EXPIRATION DATE: ABNORMAL
EXPIRATION DATE: NORMAL
GLUCOSE BLDC GLUCOMTR-MCNC: 231 MG/DL (ref 70–130)
HBA1C MFR BLD: 11.9 %
Lab: ABNORMAL
Lab: NORMAL

## 2022-06-01 PROCEDURE — 82947 ASSAY GLUCOSE BLOOD QUANT: CPT | Performed by: INTERNAL MEDICINE

## 2022-06-01 PROCEDURE — 84439 ASSAY OF FREE THYROXINE: CPT | Performed by: INTERNAL MEDICINE

## 2022-06-01 PROCEDURE — 99214 OFFICE O/P EST MOD 30 MIN: CPT | Performed by: INTERNAL MEDICINE

## 2022-06-01 PROCEDURE — 84403 ASSAY OF TOTAL TESTOSTERONE: CPT | Performed by: INTERNAL MEDICINE

## 2022-06-01 PROCEDURE — 84402 ASSAY OF FREE TESTOSTERONE: CPT | Performed by: INTERNAL MEDICINE

## 2022-06-01 PROCEDURE — 83036 HEMOGLOBIN GLYCOSYLATED A1C: CPT | Performed by: INTERNAL MEDICINE

## 2022-06-01 RX ORDER — INSULIN LISPRO 100 [IU]/ML
40 INJECTION, SOLUTION INTRAVENOUS; SUBCUTANEOUS
Qty: 24 ML | Refills: 5 | Status: SHIPPED | OUTPATIENT
Start: 2022-06-01

## 2022-06-01 RX ORDER — TESTOSTERONE CYPIONATE 200 MG/ML
INJECTION, SOLUTION INTRAMUSCULAR
Qty: 4 ML | Refills: 5 | Status: SHIPPED | OUTPATIENT
Start: 2022-06-01

## 2022-06-01 RX ORDER — SYRINGE, DISPOSABLE, 1 ML
SYRINGE, EMPTY DISPOSABLE MISCELLANEOUS
Qty: 12 EACH | Refills: 3 | Status: SHIPPED | OUTPATIENT
Start: 2022-06-01

## 2022-06-01 RX ORDER — SYRINGE WITH NEEDLE, 1 ML 25GX5/8"
SYRINGE, EMPTY DISPOSABLE MISCELLANEOUS
Qty: 12 EACH | Refills: 5 | Status: SHIPPED | OUTPATIENT
Start: 2022-06-01

## 2022-06-01 NOTE — ASSESSMENT & PLAN NOTE
Diabetes is unchanged.   Take rapid acting insulin with both meals.  Diabetes will be reassessed in 3 months.

## 2022-06-01 NOTE — PROGRESS NOTES
"     Office Note      Date: 2022  Patient Name: Grabiel Feliciano  MRN: [id number]  : 1975    Chief Complaint   Patient presents with   • Diabetes       History of Present Illness:   Grabiel Feliciano is a 47 y.o. male who presents for Diabetes type 2. Diagnosed in: 2018. Treated in past with oral agents. Current treatments: farxiga and basal plus insulin. Number of insulin shots per day: 2. Checks blood sugar 3 times a day. Has low blood sugar: no. Aspirin use: No -  . Statin use: No -  . ACE-I/ARB use: No -  .  Change in health since last visit: none.  Last eye exam: 3/2022.     He remains on weekly T injections - 100mg sq.  He is tolerating this okay. He denies any urinary complaints. He denies any cardiovascular complaints. He notes some ED. He was prescribed viagra and cialis but it hasn't helped much.      He remains on levothyroxine 300mcg qd. He is taking this correctly. He isn't taking any interfering meds concurrently. He denies any sxs of hypo- or hyperthyroidism at this time aside from fatigue.    Subjective      Diabetic Complications:  Eyes: No  Kidneys: No  Feet: No  Heart: No    Diet and Exercise:  Meals per day: 2  Minutes of exercise per week: 0 mins.    Review of Systems:   Review of Systems   Constitutional: Positive for fatigue.   Cardiovascular: Negative.    Gastrointestinal: Negative.    Endocrine: Negative.        The following portions of the patient's history were reviewed and updated as appropriate: allergies, current medications, past family history, past medical history, past social history, past surgical history and problem list.    Objective       Visit Vitals  /78   Pulse 107   Ht 185.4 cm (73\")   Wt 128 kg (281 lb 3.2 oz)   SpO2 95%   BMI 37.10 kg/m²       Physical Exam:  Physical Exam  Constitutional:       Appearance: Normal appearance.   Cardiovascular:      Pulses:           Dorsalis pedis pulses are 2+ on the right side and 2+ on the left side.        Posterior tibial " pulses are 2+ on the right side and 2+ on the left side.   Feet:      Right foot:      Protective Sensation: 5 sites tested. 5 sites sensed.      Skin integrity: Dry skin present.      Toenail Condition: Right toenails are abnormally thick.      Left foot:      Protective Sensation: 5 sites tested. 5 sites sensed.      Skin integrity: Dry skin present.      Toenail Condition: Left toenails are abnormally thick.   Neurological:      Mental Status: He is alert.         Labs:    HbA1c  Lab Results   Component Value Date    HGBA1C 11.9 06/01/2022       CMP  Lab Results   Component Value Date    GLUCOSE 216 (H) 01/06/2022    BUN 10 01/06/2022    CREATININE 1.03 01/06/2022    EGFRIFNONA >60 03/03/2022    EGFRIFAFRI >60 03/03/2022    BCR 9.7 01/06/2022    K 4.0 01/06/2022    CO2 26.0 01/06/2022    CALCIUM 10.2 01/06/2022    AST 33 01/06/2022    ALT 44 (H) 01/06/2022        Lipid Panel  Lab Results   Component Value Date    HDL 34 (L) 01/06/2022     (H) 01/06/2022    TRIG 424 (H) 01/06/2022        TSH  Lab Results   Component Value Date    FREET4 0.85 (L) 01/06/2022        Hemoglobin A1C  Lab Results   Component Value Date    HGBA1C 11.9 06/01/2022        Microalbumin/Creatinine  Lab Results   Component Value Date    MALBCRERATIO 283.3 01/06/2022    MICROALBUR 11.9 01/06/2022           Assessment / Plan      Assessment & Plan:  Diagnoses and all orders for this visit:    1. Uncontrolled type 2 diabetes mellitus with hyperglycemia (HCC) (Primary)  Assessment & Plan:  Diabetes is unchanged.   Take rapid acting insulin with both meals.  Diabetes will be reassessed in 3 months.    Orders:  -     POC Glucose, Blood  -     POC Glycosylated Hemoglobin (Hb A1C)    2. Hypogonadotropic hypogonadism (HCC)  Assessment & Plan:  Continue testo tx.  Check testo levels today.  Took his last injection this AM.    Orders:  -     Testosterone, Free, Total; Future  -     Testosterone Cypionate (DEPOTESTOTERONE CYPIONATE) 200 MG/ML  "injection; ADMINISTER 0.5 ML IN THE MUSCLE 1 TIME EVERY WEEK AS DIRECTED. DISCARD REMAINING SOLUTION AFTER EACH DOSE  Dispense: 4 mL; Refill: 5    3. Secondary hypothyroidism  Assessment & Plan:  Continue levothyroxine tx.  Check free T4 today.  TSH isn't reliable for him due to pituitary disease.    Orders:  -     T4, Free; Future    Other orders  -     insulin detemir (LEVEMIR) 100 UNIT/ML injection; Inject 60 Units under the skin into the appropriate area as directed Daily.  Dispense: 18 mL; Refill: 5  -     HumaLOG KwikPen 100 UNIT/ML solution pen-injector; Inject 40 Units under the skin into the appropriate area as directed 2 (Two) Times a Day Before Meals.  Dispense: 24 mL; Refill: 5  -     Syringe/Needle, Disp, (B-D 3CC LUER-NEGAR SYR 21GX1\") 21G X 1\" 3 ML misc; USE WEEKLY WITH TESTOSTERONE INJECTIONS  Dispense: 12 each; Refill: 5  -     Syringe/Needle, Disp, (B-D 5CC LUER-NEGAR SYR 46VS5-1/2) 22G X 1-1/2\" 5 ML misc; USE WEEKLY WITH TESTOSTERONE INJECTIONS  Dispense: 12 each; Refill: 3      Return in about 3 months (around 9/1/2022) for Recheck with A1c, free T4, testo.    Vinayak Peacock MD   06/01/2022  "

## 2022-06-01 NOTE — ASSESSMENT & PLAN NOTE
Continue levothyroxine tx.  Check free T4 today.  TSH isn't reliable for him due to pituitary disease.

## 2022-06-02 LAB — T4 FREE SERPL-MCNC: 0.53 NG/DL (ref 0.93–1.7)

## 2022-06-02 RX ORDER — LEVOTHYROXINE SODIUM 175 UG/1
350 TABLET ORAL DAILY
Qty: 180 TABLET | Refills: 3 | Status: SHIPPED | OUTPATIENT
Start: 2022-06-02 | End: 2023-04-04 | Stop reason: DRUGHIGH

## 2022-06-04 LAB
TESTOST FREE SERPL-MCNC: 18.2 PG/ML (ref 6.8–21.5)
TESTOST SERPL-MCNC: 632 NG/DL (ref 264–916)

## 2022-11-04 ENCOUNTER — TELEPHONE (OUTPATIENT)
Dept: ENDOCRINOLOGY | Facility: CLINIC | Age: 47
End: 2022-11-04

## 2022-11-04 NOTE — TELEPHONE ENCOUNTER
PT CALLED STATING HIS FARXIGA RX IS NO LONGER COVERED BY HIS INSURANCE. HE REQUESTED WE SEND IN SOMETHING THAT IS COVERED.

## 2022-11-04 NOTE — TELEPHONE ENCOUNTER
I sent Rx for Jardiance 25 mg daily to replace the Farxiga.  He has an appointment with Dr. Peacock next week.

## 2022-11-08 RX ORDER — EMPAGLIFLOZIN 25 MG/1
TABLET, FILM COATED ORAL
Qty: 30 TABLET | Refills: 2 | Status: SHIPPED | OUTPATIENT
Start: 2022-11-08

## 2022-11-11 ENCOUNTER — DOCUMENTATION (OUTPATIENT)
Dept: DIABETES SERVICES | Facility: HOSPITAL | Age: 47
End: 2022-11-11

## 2022-11-11 ENCOUNTER — OFFICE VISIT (OUTPATIENT)
Dept: ENDOCRINOLOGY | Facility: CLINIC | Age: 47
End: 2022-11-11

## 2022-11-11 VITALS
BODY MASS INDEX: 36.18 KG/M2 | SYSTOLIC BLOOD PRESSURE: 112 MMHG | OXYGEN SATURATION: 96 % | HEART RATE: 80 BPM | DIASTOLIC BLOOD PRESSURE: 68 MMHG | HEIGHT: 73 IN | WEIGHT: 273 LBS

## 2022-11-11 DIAGNOSIS — Z79.4 INSULIN LONG-TERM USE: ICD-10-CM

## 2022-11-11 DIAGNOSIS — E03.8 SECONDARY HYPOTHYROIDISM: ICD-10-CM

## 2022-11-11 DIAGNOSIS — E11.65 UNCONTROLLED TYPE 2 DIABETES MELLITUS WITH HYPERGLYCEMIA: Primary | ICD-10-CM

## 2022-11-11 DIAGNOSIS — E23.0 HYPOGONADOTROPIC HYPOGONADISM: ICD-10-CM

## 2022-11-11 LAB — HBA1C MFR BLD: 11.3 %

## 2022-11-11 PROCEDURE — 99214 OFFICE O/P EST MOD 30 MIN: CPT | Performed by: INTERNAL MEDICINE

## 2022-11-11 NOTE — ASSESSMENT & PLAN NOTE
Continue T injections.  Check testo levels today.  Controlled substance agreement was signed today.

## 2022-11-11 NOTE — PLAN OF CARE
Extended CV to assist patient with Ivanna 2. Patient was able to download the gregorio and training objectives were met. Patients questions were answered, and will most likely require reinforcement. He left with sensor in warm up and voices much satisfaction with encounter. Thank you for this referral.

## 2022-11-11 NOTE — ASSESSMENT & PLAN NOTE
Diabetes is improving with treatment.  A1c better but still well above goal.   Titrate up insulin.  Diabetes will be reassessed in 3 months.    We discussed CGM today.  Will send Rx to see if this is covered.

## 2022-11-11 NOTE — PROGRESS NOTES
"     Office Note      Date: 2022  Patient Name: Grabiel Feliciano  MRN: [id number]  : 1975    Chief Complaint   Patient presents with   • Diabetes       History of Present Illness:   Grabiel Feliciano is a 47 y.o. male who presents for Diabetes type 2. Diagnosed in: 2018. Treated in past with oral agents. Current treatments: jardiance and basal-bolus insulin. Number of insulin shots per day: 3. Checks blood sugar 3 times a day. Has low blood sugar: no. Aspirin use: No -  . Statin use: Yes. ACE-I/ARB use: No -  .  Change in health since last visit: none.  Last eye exam: 3/2022.     He remains on weekly T injections - 100mg sq.  He is tolerating this okay. He denies any urinary complaints. He denies any cardiovascular complaints. He notes some ED. He is on daily cialis which seems to be helping.      He remains on levothyroxine 350mcg qd. He is taking this correctly. He isn't taking any interfering meds concurrently. He denies any sxs of hypo- or hyperthyroidism at this time aside from fatigue.    Subjective      Diabetic Complications:  Eyes: No  Kidneys: No  Feet: No  Heart: No    Diet and Exercise:  Meals per day: 2  Minutes of exercise per week: 0 mins.    Review of Systems:   Review of Systems   Constitutional: Positive for fatigue.   Cardiovascular: Negative.    Gastrointestinal: Negative.    Endocrine: Negative.        The following portions of the patient's history were reviewed and updated as appropriate: allergies, current medications, past family history, past medical history, past social history, past surgical history and problem list.    Objective       Visit Vitals  /68   Pulse 80   Ht 185.4 cm (73\")   Wt 124 kg (273 lb)   SpO2 96%   BMI 36.02 kg/m²       Physical Exam:  Physical Exam  Constitutional:       Appearance: Normal appearance.   Neurological:      Mental Status: He is alert.         Labs:    HbA1c  Lab Results   Component Value Date    HGBA1C 11.9 2022       CMP  Lab Results "   Component Value Date    GLUCOSE 216 (H) 01/06/2022    BUN 10 01/06/2022    CREATININE 1.03 01/06/2022    EGFRIFNONA >60 03/03/2022    EGFRIFAFRI >60 03/03/2022    BCR 9.7 01/06/2022    K 4.0 01/06/2022    CO2 26.0 01/06/2022    CALCIUM 10.2 01/06/2022    AST 33 01/06/2022    ALT 44 (H) 01/06/2022        Lipid Panel  Lab Results   Component Value Date    HDL 34 (L) 01/06/2022     (H) 01/06/2022    TRIG 424 (H) 01/06/2022        TSH  Lab Results   Component Value Date    FREET4 0.53 (L) 06/01/2022        Hemoglobin A1C  Lab Results   Component Value Date    HGBA1C 11.9 06/01/2022        Microalbumin/Creatinine  Lab Results   Component Value Date    MALBCRERATIO 283.3 01/06/2022    MICROALBUR 11.9 01/06/2022           Assessment / Plan      Assessment & Plan:  Diagnoses and all orders for this visit:    1. Uncontrolled type 2 diabetes mellitus with hyperglycemia (HCC) (Primary)  Assessment & Plan:  Diabetes is improving with treatment.  A1c better but still well above goal.   Titrate up insulin.  Diabetes will be reassessed in 3 months.    We discussed CGM today.  Will send Rx to see if this is covered.      Orders:  -     Cancel: POC Glucose, Blood  -     Cancel: POC Glycosylated Hemoglobin (Hb A1C)    2. Secondary hypothyroidism  Assessment & Plan:  Continue T4 tx.  Check free T4 today.    Orders:  -     T4, Free; Future    3. Hypogonadotropic hypogonadism (HCC)  Assessment & Plan:  Continue T injections.  Check testo levels today.  Controlled substance agreement was signed today.    Orders:  -     Testosterone Free MS / Dialysis; Future    4. Insulin long-term use (HCC)    Other orders  -     Continuous Blood Gluc Sensor (FreeStyle Ivanna 2 Sensor) misc; 1 each Every 14 (Fourteen) Days.  Dispense: 2 each; Refill: 5  -     Continuous Blood Gluc  (FreeStyle Ivanna 2 [place]) device; 1 each Daily.  Dispense: 1 each; Refill: 0      Return in about 3 months (around 2/11/2023) for Recheck with A1c, CMP, CBC,  lipids, free T4, microalbumin, testo, cortisol.    iVnayak Peacock MD   11/11/2022

## 2022-11-18 LAB
T4 FREE SERPL-MCNC: 0.6 NG/DL (ref 0.82–1.77)
TESTOST FREE MFR SERPL: 2.1 %
TESTOST FREE SERPL-MCNC: 69 PG/ML
TESTOST SERPL-MCNC: 330 NG/DL

## 2023-04-03 ENCOUNTER — OFFICE VISIT (OUTPATIENT)
Dept: ENDOCRINOLOGY | Facility: CLINIC | Age: 48
End: 2023-04-03
Payer: MEDICARE

## 2023-04-03 VITALS
SYSTOLIC BLOOD PRESSURE: 128 MMHG | HEART RATE: 77 BPM | BODY MASS INDEX: 36.58 KG/M2 | DIASTOLIC BLOOD PRESSURE: 78 MMHG | WEIGHT: 276 LBS | OXYGEN SATURATION: 96 % | HEIGHT: 73 IN

## 2023-04-03 DIAGNOSIS — E11.65 UNCONTROLLED TYPE 2 DIABETES MELLITUS WITH HYPERGLYCEMIA: Primary | ICD-10-CM

## 2023-04-03 DIAGNOSIS — Z79.4 INSULIN LONG-TERM USE: ICD-10-CM

## 2023-04-03 DIAGNOSIS — E23.0 HYPOGONADOTROPIC HYPOGONADISM: ICD-10-CM

## 2023-04-03 DIAGNOSIS — E03.8 SECONDARY HYPOTHYROIDISM: ICD-10-CM

## 2023-04-03 LAB
ALBUMIN SERPL-MCNC: 4.4 G/DL (ref 3.5–5.2)
ALBUMIN UR-MCNC: 14.2 MG/DL
ALBUMIN/GLOB SERPL: 1.6 G/DL
ALP SERPL-CCNC: 65 U/L (ref 39–117)
ALT SERPL W P-5'-P-CCNC: 35 U/L (ref 1–41)
ANION GAP SERPL CALCULATED.3IONS-SCNC: 9 MMOL/L (ref 5–15)
AST SERPL-CCNC: 31 U/L (ref 1–40)
BILIRUB SERPL-MCNC: 0.4 MG/DL (ref 0–1.2)
BUN SERPL-MCNC: 12 MG/DL (ref 6–20)
BUN/CREAT SERPL: 13.2 (ref 7–25)
CALCIUM SPEC-SCNC: 8.9 MG/DL (ref 8.6–10.5)
CHLORIDE SERPL-SCNC: 100 MMOL/L (ref 98–107)
CHOLEST SERPL-MCNC: 204 MG/DL (ref 0–200)
CO2 SERPL-SCNC: 27 MMOL/L (ref 22–29)
CORTIS SERPL-MCNC: 2.72 MCG/DL
CREAT SERPL-MCNC: 0.91 MG/DL (ref 0.76–1.27)
CREAT UR-MCNC: 77 MG/DL
EGFRCR SERPLBLD CKD-EPI 2021: 103.3 ML/MIN/1.73
EXPIRATION DATE: ABNORMAL
EXPIRATION DATE: NORMAL
GLOBULIN UR ELPH-MCNC: 2.8 GM/DL
GLUCOSE BLDC GLUCOMTR-MCNC: 288 MG/DL (ref 70–130)
GLUCOSE SERPL-MCNC: 243 MG/DL (ref 65–99)
HBA1C MFR BLD: 10.7 %
HDLC SERPL-MCNC: 28 MG/DL (ref 40–60)
LDLC SERPL CALC-MCNC: 87 MG/DL (ref 0–100)
LDLC/HDLC SERPL: 2.41 {RATIO}
Lab: ABNORMAL
Lab: NORMAL
MICROALBUMIN/CREAT UR: 184.4 MG/G
POTASSIUM SERPL-SCNC: 4 MMOL/L (ref 3.5–5.2)
PROT SERPL-MCNC: 7.2 G/DL (ref 6–8.5)
SODIUM SERPL-SCNC: 136 MMOL/L (ref 136–145)
T4 FREE SERPL-MCNC: 0.59 NG/DL (ref 0.93–1.7)
TRIGL SERPL-MCNC: 543 MG/DL (ref 0–150)
VLDLC SERPL-MCNC: 89 MG/DL (ref 5–40)

## 2023-04-03 PROCEDURE — 80053 COMPREHEN METABOLIC PANEL: CPT | Performed by: INTERNAL MEDICINE

## 2023-04-03 PROCEDURE — 80061 LIPID PANEL: CPT | Performed by: INTERNAL MEDICINE

## 2023-04-03 PROCEDURE — 99214 OFFICE O/P EST MOD 30 MIN: CPT | Performed by: INTERNAL MEDICINE

## 2023-04-03 PROCEDURE — 83036 HEMOGLOBIN GLYCOSYLATED A1C: CPT | Performed by: INTERNAL MEDICINE

## 2023-04-03 PROCEDURE — 1160F RVW MEDS BY RX/DR IN RCRD: CPT | Performed by: INTERNAL MEDICINE

## 2023-04-03 PROCEDURE — 82947 ASSAY GLUCOSE BLOOD QUANT: CPT | Performed by: INTERNAL MEDICINE

## 2023-04-03 PROCEDURE — 82533 TOTAL CORTISOL: CPT | Performed by: INTERNAL MEDICINE

## 2023-04-03 PROCEDURE — 82043 UR ALBUMIN QUANTITATIVE: CPT | Performed by: INTERNAL MEDICINE

## 2023-04-03 PROCEDURE — 3046F HEMOGLOBIN A1C LEVEL >9.0%: CPT | Performed by: INTERNAL MEDICINE

## 2023-04-03 PROCEDURE — 84403 ASSAY OF TOTAL TESTOSTERONE: CPT | Performed by: INTERNAL MEDICINE

## 2023-04-03 PROCEDURE — 82570 ASSAY OF URINE CREATININE: CPT | Performed by: INTERNAL MEDICINE

## 2023-04-03 PROCEDURE — 84439 ASSAY OF FREE THYROXINE: CPT | Performed by: INTERNAL MEDICINE

## 2023-04-03 PROCEDURE — 1159F MED LIST DOCD IN RCRD: CPT | Performed by: INTERNAL MEDICINE

## 2023-04-03 PROCEDURE — 84402 ASSAY OF FREE TESTOSTERONE: CPT | Performed by: INTERNAL MEDICINE

## 2023-04-03 NOTE — ASSESSMENT & PLAN NOTE
Diabetes is improving with treatment.  A1c better but still above goal.  Continue current treatment regimen.  Increase mealtime insulin.  Diabetes will be reassessed in 3 months.    He forgot to bring the Ivanna reader with him today.  We weren't able to download his data.  However he has noted postprandial spikes.

## 2023-04-03 NOTE — PROGRESS NOTES
"     Office Note      Date: 2023  Patient Name: Grabiel Feliciano  MRN: [id number]  : 1975    Chief Complaint   Patient presents with   • Diabetes       History of Present Illness:   Grabiel Feliciano is a 48 y.o. male who presents for Diabetes type 2. Diagnosed in: 2018. Treated in past with oral agents. Current treatments: jardiance and basal-bolus insulin. Number of insulin shots per day: 3. Checks blood sugar 288 times a day - on FreeStyle Ivanna 2. Has low blood sugar: no. Aspirin use: No -  . Statin use: Yes. ACE-I/ARB use: No -  .  Change in health since last visit: none.  Last eye exam: 3/2023.     He remains on weekly T injections - 100mg sq.  He is tolerating this okay. He denies any urinary complaints. He denies any cardiovascular complaints. He notes some ED. He is on daily cialis which seems to be helping.      He remains on levothyroxine 350mcg qd. He is taking this correctly. He isn't taking any interfering meds concurrently. He denies any sxs of hypo- or hyperthyroidism at this time aside from fatigue.    Subjective      Diabetic Complications:  Eyes: No  Kidneys: No  Feet: No  Heart: No    Diet and Exercise:  Meals per day: 2  Minutes of exercise per week: 0 mins.    Review of Systems:   Review of Systems   Constitutional: Positive for fatigue.   Cardiovascular: Negative.    Gastrointestinal: Negative.    Endocrine: Negative.        The following portions of the patient's history were reviewed and updated as appropriate: allergies, current medications, past family history, past medical history, past social history, past surgical history and problem list.    Objective       Visit Vitals  /78 (BP Location: Right arm, Patient Position: Sitting, Cuff Size: Adult)   Pulse 77   Ht 185.4 cm (72.99\")   Wt 125 kg (276 lb)   SpO2 96%   BMI 36.42 kg/m²       Physical Exam:  Physical Exam  Constitutional:       Appearance: Normal appearance.   Neurological:      Mental Status: He is alert. "         Labs:    HbA1c  Lab Results   Component Value Date    HGBA1C 10.7 04/03/2023       CMP  Lab Results   Component Value Date    GLUCOSE 243 (H) 04/03/2023    BUN 12 04/03/2023    CREATININE 0.91 04/03/2023    EGFRIFNONA >60 03/03/2022    EGFRIFAFRI >60 03/03/2022    BCR 13.2 04/03/2023    K 4.0 04/03/2023    CO2 27.0 04/03/2023    CALCIUM 8.9 04/03/2023    AST 31 04/03/2023    ALT 35 04/03/2023        Lipid Panel  Lab Results   Component Value Date    HDL 28 (L) 04/03/2023    LDL 87 04/03/2023    TRIG 543 (H) 04/03/2023        TSH  Lab Results   Component Value Date    FREET4 0.59 (L) 04/03/2023        Hemoglobin A1C  Lab Results   Component Value Date    HGBA1C 10.7 04/03/2023        Microalbumin/Creatinine  Lab Results   Component Value Date    MALBCRERATIO 184.4 04/03/2023    MICROALBUR 14.2 04/03/2023           Assessment / Plan      Assessment & Plan:  Diagnoses and all orders for this visit:    1. Uncontrolled type 2 diabetes mellitus with hyperglycemia (Primary)  Assessment & Plan:  Diabetes is improving with treatment.  A1c better but still above goal.  Continue current treatment regimen.  Increase mealtime insulin.  Diabetes will be reassessed in 3 months.    He forgot to bring the Ivanna reader with him today.  We weren't able to download his data.  However he has noted postprandial spikes.      Orders:  -     POC Glycosylated Hemoglobin (Hb A1C)  -     POC Glucose, Blood  -     Comprehensive Metabolic Panel; Future  -     Lipid Panel; Future  -     Microalbumin / Creatinine Urine Ratio - Urine, Clean Catch; Future  -     Comprehensive Metabolic Panel  -     Lipid Panel  -     Microalbumin / Creatinine Urine Ratio - Urine, Clean Catch    2. Secondary hypothyroidism  Assessment & Plan:  Continue T4 tx.  Check T4 level.  (TSH not reliable).    Orders:  -     T4, Free; Future  -     T4, Free    3. Hypogonadotropic hypogonadism  Assessment & Plan:  Continue T injections.  Check T levels today.  Last  "injection was 3 days ago.    Orders:  -     Testosterone Free MS / Dialysis; Future  -     Cortisol; Future  -     Testosterone Free MS / Dialysis  -     Cortisol    4. Insulin long-term use    Current Outpatient Medications   Medication Instructions   • ARIPiprazole (ABILIFY) 2 mg, Oral, Daily   • buPROPion XL (WELLBUTRIN XL) 150 mg, Oral, Every Morning   • Continuous Blood Gluc  (FreeStyle Ivanna 2 [place]) device 1 each, Does not apply, Daily   • Continuous Blood Gluc Sensor (FreeStyle Ivanna 2 Sensor) misc 1 each, Does not apply, Every 14 Days   • FLUoxetine (PROZAC) 20 mg, Oral, 3 Times Daily   • HumaLOG KwikPen 40 Units, Subcutaneous, 2 Times Daily Before Meals   • insulin detemir (LEVEMIR) 60 Units, Subcutaneous, Daily   • Insulin Pen Needle (Pen Needles) 32G X 4 MM misc 2 each, Does not apply, Daily   • Jardiance 25 MG tablet tablet TAKE 1 TABLET BY MOUTH DAILY   • levothyroxine (SYNTHROID, LEVOTHROID) 350 mcg, Oral, Daily   • naproxen (NAPROSYN) 500 MG tablet Take 1 tablet by mouth Every 12 (Twelve) Hours with food or milk, as needed.   • omeprazole (PRILOSEC) 40 mg, Oral, 2 Times Daily   • rosuvastatin (CRESTOR) 5 mg, Oral, Daily   • Syringe/Needle, Disp, (B-D 3CC LUER-NEGAR SYR 21GX1\") 21G X 1\" 3 ML misc USE WEEKLY WITH TESTOSTERONE INJECTIONS   • Syringe/Needle, Disp, (B-D 5CC LUER-NEGAR SYR 85MP1-1/2) 22G X 1-1/2\" 5 ML misc USE WEEKLY WITH TESTOSTERONE INJECTIONS   • Testosterone Cypionate (DEPOTESTOTERONE CYPIONATE) 200 MG/ML injection ADMINISTER 0.5 ML IN THE MUSCLE 1 TIME EVERY WEEK AS DIRECTED. DISCARD REMAINING SOLUTION AFTER EACH DOSE      Return in about 3 months (around 7/3/2023) for Recheck with A1c, free T4, cortisol.    Vinayak Peacock MD   04/03/2023  "

## 2023-04-04 RX ORDER — LEVOTHYROXINE SODIUM 0.2 MG/1
400 TABLET ORAL DAILY
Qty: 180 TABLET | Refills: 3 | Status: SHIPPED | OUTPATIENT
Start: 2023-04-04

## 2023-04-10 LAB
TESTOST FREE MFR SERPL: 1.7 %
TESTOST FREE SERPL-MCNC: 19 PG/ML
TESTOST SERPL-MCNC: 110 NG/DL

## 2023-04-11 DIAGNOSIS — E23.0 HYPOGONADOTROPIC HYPOGONADISM: ICD-10-CM

## 2023-04-11 RX ORDER — TESTOSTERONE CYPIONATE 200 MG/ML
INJECTION, SOLUTION INTRAMUSCULAR
Qty: 4 ML | Refills: 5 | Status: SHIPPED | OUTPATIENT
Start: 2023-04-11

## 2023-08-01 RX ORDER — INSULIN LISPRO 100 [IU]/ML
INJECTION, SOLUTION INTRAVENOUS; SUBCUTANEOUS
Qty: 24 ML | Refills: 5 | Status: SHIPPED | OUTPATIENT
Start: 2023-08-01

## 2023-08-01 RX ORDER — PEN NEEDLE, DIABETIC 30 GX3/16"
2 NEEDLE, DISPOSABLE MISCELLANEOUS DAILY
Qty: 200 EACH | Refills: 3 | Status: SHIPPED | OUTPATIENT
Start: 2023-08-01

## 2023-08-29 ENCOUNTER — OFFICE VISIT (OUTPATIENT)
Dept: ENDOCRINOLOGY | Facility: CLINIC | Age: 48
End: 2023-08-29
Payer: MEDICARE

## 2023-08-29 VITALS
OXYGEN SATURATION: 95 % | HEIGHT: 72 IN | BODY MASS INDEX: 36.84 KG/M2 | WEIGHT: 272 LBS | SYSTOLIC BLOOD PRESSURE: 122 MMHG | DIASTOLIC BLOOD PRESSURE: 78 MMHG | HEART RATE: 71 BPM

## 2023-08-29 DIAGNOSIS — E03.8 SECONDARY HYPOTHYROIDISM: ICD-10-CM

## 2023-08-29 DIAGNOSIS — E11.65 UNCONTROLLED TYPE 2 DIABETES MELLITUS WITH HYPERGLYCEMIA: Primary | ICD-10-CM

## 2023-08-29 DIAGNOSIS — E23.0 HYPOGONADOTROPIC HYPOGONADISM: ICD-10-CM

## 2023-08-29 LAB
CORTIS SERPL-MCNC: 3.62 MCG/DL
EXPIRATION DATE: ABNORMAL
EXPIRATION DATE: NORMAL
GLUCOSE BLDC GLUCOMTR-MCNC: 259 MG/DL (ref 70–130)
HBA1C MFR BLD: 6.9 %
Lab: ABNORMAL
Lab: NORMAL
T4 FREE SERPL-MCNC: 0.61 NG/DL (ref 0.93–1.7)

## 2023-08-29 PROCEDURE — 82533 TOTAL CORTISOL: CPT | Performed by: INTERNAL MEDICINE

## 2023-08-29 PROCEDURE — 84439 ASSAY OF FREE THYROXINE: CPT | Performed by: INTERNAL MEDICINE

## 2023-08-29 PROCEDURE — 84402 ASSAY OF FREE TESTOSTERONE: CPT | Performed by: INTERNAL MEDICINE

## 2023-08-29 PROCEDURE — 84403 ASSAY OF TOTAL TESTOSTERONE: CPT | Performed by: INTERNAL MEDICINE

## 2023-08-29 RX ORDER — ASPIRIN 81 MG/1
81 TABLET, CHEWABLE ORAL DAILY
COMMUNITY
Start: 2023-08-02

## 2023-08-29 RX ORDER — INSULIN GLARGINE 100 [IU]/ML
INJECTION, SOLUTION SUBCUTANEOUS
COMMUNITY
Start: 2023-06-22

## 2023-08-29 RX ORDER — ATORVASTATIN CALCIUM 80 MG/1
TABLET, FILM COATED ORAL
COMMUNITY
Start: 2023-08-24

## 2023-08-29 RX ORDER — HYDROXYZINE HYDROCHLORIDE 25 MG/1
1 TABLET, FILM COATED ORAL 3 TIMES DAILY
COMMUNITY
Start: 2023-06-24

## 2023-08-29 NOTE — PROGRESS NOTES
"     Office Note      Date: 2023  Patient Name: Grabiel Feliciano  MRN: [id number]  : 1975    Chief Complaint   Patient presents with    Diabetes       History of Present Illness:   Grabiel Feliciano is a 48 y.o. male who presents for Diabetes type 2. Diagnosed in: 2018. Treated in past with oral agents. Current treatments: jardiance and basal-bolus insulin. Number of insulin shots per day: 3. Checks blood sugar 288 times a day - on FreeStyle Ivanna 2. Has low blood sugar: no. Aspirin use: Yes. Statin use: Yes. ACE-I/ARB use: No -  .  Change in health since last visit: none.  Last eye exam: 3/2023.     He remains on weekly T injections - 150mg.  He is tolerating this okay. He denies any urinary complaints. He denies any cardiovascular complaints. He notes some ED. He is on daily cialis which seems to be helping.      He remains on levothyroxine 400mcg qd. He is taking this correctly. He isn't taking any interfering meds concurrently. He denies any sxs of hypo- or hyperthyroidism at this time aside from fatigue.    Subjective      Diabetic Complications:  Eyes: No  Kidneys: No  Feet: No  Heart: No    Diet and Exercise:  Meals per day: 2  Minutes of exercise per week: 0 mins.    Review of Systems:   Review of Systems   Constitutional:  Positive for fatigue.   Cardiovascular: Negative.    Gastrointestinal: Negative.    Endocrine: Negative.      The following portions of the patient's history were reviewed and updated as appropriate: allergies, current medications, past family history, past medical history, past social history, past surgical history, and problem list.    Objective     Visit Vitals  /78   Pulse 71   Ht 182.9 cm (72.01\")   Wt 123 kg (272 lb)   SpO2 95%   BMI 36.88 kg/mý       Physical Exam:  Physical Exam  Constitutional:       Appearance: Normal appearance.   Neurological:      Mental Status: He is alert.       Labs:    HbA1c  Lab Results   Component Value Date    HGBA1C 6.9 2023 "       CMP  Lab Results   Component Value Date    GLUCOSE 243 (H) 04/03/2023    BUN 12 04/03/2023    CREATININE 0.91 04/03/2023    EGFRIFNONA >60 03/03/2022    EGFRIFAFRI >60 03/03/2022    BCR 13.2 04/03/2023    K 4.0 04/03/2023    CO2 27.0 04/03/2023    CALCIUM 8.9 04/03/2023    AST 31 04/03/2023    ALT 35 04/03/2023        Lipid Panel  Lab Results   Component Value Date    HDL 28 (L) 04/03/2023    LDL 87 04/03/2023    TRIG 543 (H) 04/03/2023        TSH  Lab Results   Component Value Date    FREET4 0.59 (L) 04/03/2023        Hemoglobin A1C  Lab Results   Component Value Date    HGBA1C 6.9 08/29/2023        Microalbumin/Creatinine  Lab Results   Component Value Date    MALBCRERATIO 184.4 04/03/2023    MICROALBUR 14.2 04/03/2023           Assessment / Plan      Assessment & Plan:  Diagnoses and all orders for this visit:    1. Uncontrolled type 2 diabetes mellitus with hyperglycemia (Primary)  Assessment & Plan:  Diabetes is improving with treatment.   Continue current treatment regimen.  Diabetes will be reassessed in 3 months.    Orders:  -     POC Glucose, Blood  -     POC Glycosylated Hemoglobin (Hb A1C)  -     Cortisol; Future    2. Hypogonadotropic hypogonadism  Assessment & Plan:  Continue T injections.  Check T levels today.    Orders:  -     Testosterone Free MS / Dialysis; Future    3. Secondary hypothyroidism  Assessment & Plan:  Continue T4 tx.  Check free T4 today.    Orders:  -     T4, Free; Future      Current Outpatient Medications   Medication Instructions    ARIPiprazole (ABILIFY) 2 mg, Oral, Daily    aspirin 81 mg, Oral, Daily, Chew and swallow.     atorvastatin (LIPITOR) 80 MG tablet     buPROPion XL (WELLBUTRIN XL) 150 mg, Oral, Every Morning    Continuous Blood Gluc  (FreeStyle Ivanna 2 [place]) device 1 each, Does not apply, Daily    Continuous Blood Gluc Sensor (FreeStyle Ivanna 2 Sensor) misc CHANGE EVERY 14 DAYS AS DIRECTED    FLUoxetine (PROZAC) 20 mg, Oral, 3 Times Daily    HumaLOG  "KwikPen 100 UNIT/ML solution pen-injector ADMINISTER 40 UNITS UNDER THE SKIN TWICE DAILY BEFORE MEALS AS DIRECTED    hydrOXYzine (ATARAX) 25 MG tablet 1 tablet, Oral, 3 Times Daily    Insulin Pen Needle (Pen Needles) 32G X 4 MM misc 2 each, Does not apply, Daily    Jardiance 25 MG tablet tablet TAKE 1 TABLET BY MOUTH DAILY    Lantus SoloStar 100 UNIT/ML injection pen ADMINISTER 75 UNITS UNDER THE SKIN EVERY DAY    levothyroxine (SYNTHROID, LEVOTHROID) 400 mcg, Oral, Daily    naproxen (NAPROSYN) 500 MG tablet Take 1 tablet by mouth Every 12 (Twelve) Hours with food or milk, as needed.    omeprazole (PRILOSEC) 40 mg, Oral, 2 Times Daily    Syringe/Needle, Disp, (B-D 3CC LUER-NEGAR SYR 21GX1\") 21G X 1\" 3 ML misc USE WEEKLY WITH TESTOSTERONE INJECTIONS    Syringe/Needle, Disp, (B-D 5CC LUER-NEGAR SYR 48ND2-6/2) 22G X 1-1/2\" 5 ML misc USE WEEKLY WITH TESTOSTERONE INJECTIONS    Testosterone Cypionate (DEPOTESTOTERONE CYPIONATE) 200 MG/ML injection ADMINISTER 0.75 ML IN THE MUSCLE 1 TIME EVERY WEEK AS DIRECTED. DISCARD REMAINING SOLUTION AFTER EACH DOSE      Return in about 3 months (around 11/29/2023) for Recheck with A1c, free T4, testo, controlled substance agreement.    Vinayak Peacock MD   08/29/2023  "

## 2023-08-30 RX ORDER — LEVOTHYROXINE SODIUM 0.05 MG/1
50 TABLET ORAL DAILY
Qty: 90 TABLET | Refills: 3 | Status: SHIPPED | OUTPATIENT
Start: 2023-08-30

## 2023-08-30 RX ORDER — LEVOTHYROXINE SODIUM 0.2 MG/1
400 TABLET ORAL DAILY
Qty: 180 TABLET | Refills: 3 | Status: SHIPPED | OUTPATIENT
Start: 2023-08-30

## 2023-09-06 LAB
TESTOST FREE MFR SERPL: 1.7 %
TESTOST FREE SERPL-MCNC: 13 PG/ML
TESTOST SERPL-MCNC: 78 NG/DL

## 2023-11-27 RX ORDER — EMPAGLIFLOZIN 25 MG/1
TABLET, FILM COATED ORAL
Qty: 90 TABLET | Refills: 3 | Status: SHIPPED | OUTPATIENT
Start: 2023-11-27

## 2023-11-27 NOTE — TELEPHONE ENCOUNTER
Rx Refill Note  Requested Prescriptions     Pending Prescriptions Disp Refills    Jardiance 25 MG tablet tablet [Pharmacy Med Name: JARDIANCE 25MG TABLETS] 30 tablet 3     Sig: TAKE 1 TABLET BY MOUTH DAILY    Dx Code:  E11.65  Last office visit with prescribing clinician: 8/29/2023   Last telemedicine visit with prescribing clinician: Visit date not found   Next office visit with prescribing clinician: 2/1/2024                         Would you like a call back once the refill request has been completed: [] Yes [] No    If the office needs to give you a call back, can they leave a voicemail: [] Yes [] No    Ruchi Linder MA  11/27/23, 13:15 EST

## 2024-01-25 ENCOUNTER — TELEPHONE (OUTPATIENT)
Dept: ENDOCRINOLOGY | Facility: CLINIC | Age: 49
End: 2024-01-25
Payer: MEDICARE

## 2024-01-25 ENCOUNTER — PRIOR AUTHORIZATION (OUTPATIENT)
Dept: ENDOCRINOLOGY | Facility: CLINIC | Age: 49
End: 2024-01-25
Payer: MEDICARE

## 2024-01-25 NOTE — TELEPHONE ENCOUNTER
PT CAME INTO THE OFFICE WRONG DAY FOR APPT. HE HAS [facility] HMO. I ENCOURAGED HIM TO REACH OUT TO HIS INSURANCE PROVIDER AND REQUEST A CONTINUITY OF CARE FORM.

## 2024-01-25 NOTE — TELEPHONE ENCOUNTER
PT CAME INTO OFFICE ON WRONG DAY OF APPT. HE REQUESTED PA TO BE SUBMITTED ON KALPANA 2 SENSORS FROM [employer] IN [place], [place].

## 2024-01-26 NOTE — TELEPHONE ENCOUNTER
Grabiel Feliciano (Key: [id number])  PA Case ID #: [id number]  Rx #: [id number]  Need Help? Call us at [phone]  Outcome  Approved on January 25  The request has been approved. The authorization is effective from 01/25/2024 to 01/24/2025, as long as the member is enrolled in their current health plan. The request was approved as submitted. A written notification letter will follow with additional details.  Authorization Expiration Date: 1/23/2025  Drug  FreeStyle Ivanna 2 Sensor  ePA cloud logo  Form  [employer] [place] Medicaid ePA Form 2017 NCPDP

## 2024-03-11 NOTE — TELEPHONE ENCOUNTER
Provider: MILES    Caller: ALAN BATISTA    Relationship to Patient: SELF    Reason for Call: PATIENT STATES THAT HE WILL NO LONGER BE COMING TO THE OFFICE DUE TO INSURANCE NOT  BEING PAR WITH OFFICE. IS HAVING HIS PCP TO MONITOR AND DO PRESCRIPTIONS

## 2024-03-11 NOTE — TELEPHONE ENCOUNTER
Left patient message to schedule an appointment -- in chart , looks like PA was approved 01-     Rx Refill Note  Requested Prescriptions     Pending Prescriptions Disp Refills    Continuous Blood Gluc Sensor (FreeStyle Ivanna 2 Sensor) misc [Pharmacy Med Name: FREESTYLE IVANNA 2 SENSOR] 2 each 5     Sig: CHANGE EVERY 14 DAYS AS NEEDED      Last office visit with prescribing clinician: 8/29/2023   Last telemedicine visit with prescribing clinician: Visit date not found   Next office visit with prescribing clinician: Visit date not found                         Would you like a call back once the refill request has been completed: [] Yes [] No    If the office needs to give you a call back, can they leave a voicemail: [] Yes [] No    Nikki Pittman MA  03/11/24, 15:16 EDT

## 2024-12-13 NOTE — TELEPHONE ENCOUNTER
Rx Refill Note  Requested Prescriptions     Pending Prescriptions Disp Refills    Continuous Glucose Sensor (FreeStyle Ivanna 2 Sensor) misc [Pharmacy Med Name: FREESTYLE IVANNA 2 SENSOR KIT] 4 each 0     Sig: CHANGE EVERY 14 DAYS AS NEEDED          Last office visit with prescribing clinician: 8/29/2023     Next office visit with prescribing clinician: Visit date not found         Tania Lima MA  12/13/24, 09:39 EST